# Patient Record
Sex: MALE | Race: WHITE | NOT HISPANIC OR LATINO | Employment: UNEMPLOYED | URBAN - METROPOLITAN AREA
[De-identification: names, ages, dates, MRNs, and addresses within clinical notes are randomized per-mention and may not be internally consistent; named-entity substitution may affect disease eponyms.]

---

## 2021-03-01 ENCOUNTER — OFFICE VISIT (OUTPATIENT)
Dept: GASTROENTEROLOGY | Facility: CLINIC | Age: 41
End: 2021-03-01

## 2021-03-01 ENCOUNTER — TELEPHONE (OUTPATIENT)
Dept: GASTROENTEROLOGY | Facility: CLINIC | Age: 41
End: 2021-03-01

## 2021-03-01 VITALS — WEIGHT: 227.4 LBS | HEIGHT: 72 IN | TEMPERATURE: 97 F | BODY MASS INDEX: 30.8 KG/M2

## 2021-03-01 DIAGNOSIS — R10.13 DYSPEPSIA: Primary | ICD-10-CM

## 2021-03-01 DIAGNOSIS — R13.19 ESOPHAGEAL DYSPHAGIA: ICD-10-CM

## 2021-03-01 DIAGNOSIS — Z80.0 FAMILY HISTORY OF COLON CANCER: ICD-10-CM

## 2021-03-01 DIAGNOSIS — Z83.71 FAMILY HISTORY OF POLYPS IN THE COLON: ICD-10-CM

## 2021-03-01 DIAGNOSIS — R10.31 RIGHT LOWER QUADRANT ABDOMINAL PAIN: ICD-10-CM

## 2021-03-01 DIAGNOSIS — R10.13 EPIGASTRIC PAIN: ICD-10-CM

## 2021-03-01 PROBLEM — K12.0 APHTHOUS ULCER OF MOUTH: Status: ACTIVE | Noted: 2019-07-18

## 2021-03-01 PROBLEM — D22.9 MULTIPLE NEVI: Status: ACTIVE | Noted: 2021-03-01

## 2021-03-01 PROBLEM — F41.1 ANXIETY STATE: Status: ACTIVE | Noted: 2017-11-25

## 2021-03-01 PROBLEM — K21.9 GASTROESOPHAGEAL REFLUX DISEASE: Status: ACTIVE | Noted: 2017-11-25

## 2021-03-01 PROBLEM — M54.2 NECK PAIN: Status: ACTIVE | Noted: 2017-08-14

## 2021-03-01 PROBLEM — Z72.0 TOBACCO USER: Status: ACTIVE | Noted: 2019-07-18

## 2021-03-01 PROBLEM — Z83.719 FAMILY HISTORY OF POLYPS IN THE COLON: Status: ACTIVE | Noted: 2021-03-01

## 2021-03-01 PROCEDURE — 99204 OFFICE O/P NEW MOD 45 MIN: CPT | Performed by: NURSE PRACTITIONER

## 2021-03-01 RX ORDER — PANTOPRAZOLE SODIUM 40 MG/1
40 TABLET, DELAYED RELEASE ORAL
Status: ON HOLD | COMMUNITY
Start: 2021-02-17 | End: 2021-04-06 | Stop reason: SDUPTHER

## 2021-03-01 NOTE — PROGRESS NOTES
Chief Complaint   Patient presents with   • Abdominal Pain     HPI    Héctor Crabtree is a  40 y.o. male here to establish care as a new patient for abdominal pain and dyspepsia.  This patient will also follow with Dr. Dubon.  This patient was seen in the emergency room in January of this year ([facility]) for complaints of left-sided chest pain with burning for the last 16 years but worse at that time.  Worse with laying down and worse with certain foods.  Work-up at that time showed normal labs, negative CTA, negative troponins, and no signs of acute ischemia on EKG.    Currently he reports some improvement in epigastric pain after starting Protonix 40 mg p.o. daily 1 week ago.  Pain still mild in nature comes and goes seems to correlate with eating described as a burning sensation.  No nausea, vomiting, or weight loss.  Some very mild descriptions of what sounds like esophageal dysphagia relieved with drinking water.  He has never had an EGD.    He goes on to complain of right lower quadrant abdominal discomfort described as a cramping aching sensation that comes and goes.  He denies diarrhea, constipation, rectal bleeding.  His father recently had a colonoscopy with several polyps removed.  His grandmother on his father side has a history of colon cancer.    He rarely drinks alcohol.  He rarely uses NSAIDs.  He still has his gallbladder.      Past Medical History:   Diagnosis Date   • GERD (gastroesophageal reflux disease)        Past Surgical History:   Procedure Laterality Date   • EXCISION MASS HEAD/NECK  2007       Scheduled Meds:  Outpatient Encounter Medications as of 3/1/2021   Medication Sig Dispense Refill   • pantoprazole (PROTONIX) 40 MG EC tablet Take 40 mg by mouth Every Morning Before Breakfast.       No facility-administered encounter medications on file as of 3/1/2021.        Continuous Infusions:No current facility-administered medications for this visit.       PRN Meds:.    No Known  Allergies    Social History     Socioeconomic History   • Marital status: Single     Spouse name: Not on file   • Number of children: Not on file   • Years of education: Not on file   • Highest education level: Not on file   Tobacco Use   • Smoking status: Current Every Day Smoker   • Smokeless tobacco: Current User   • Tobacco comment: Vapes   Substance and Sexual Activity   • Alcohol use: Not Currently   • Drug use: Yes     Types: Marijuana   • Sexual activity: Defer       Family History   Problem Relation Age of Onset   • Colon polyps Father    • Kidney failure Father    • Crohn's disease Paternal Grandmother        Review of Systems   Constitutional: Negative for activity change, appetite change, fatigue, fever and unexpected weight change.   HENT: Positive for trouble swallowing. Negative for voice change.    Eyes: Negative.    Respiratory: Negative for apnea, cough, choking, chest tightness, shortness of breath and wheezing.    Cardiovascular: Negative for chest pain, palpitations and leg swelling.   Gastrointestinal: Positive for abdominal pain. Negative for abdominal distention, anal bleeding, blood in stool, constipation, diarrhea, nausea, rectal pain and vomiting.   Endocrine: Negative.    Genitourinary: Negative.    Musculoskeletal: Negative.    Skin: Negative.    Allergic/Immunologic: Negative.    Neurological: Negative.    Hematological: Negative.    Psychiatric/Behavioral: Negative.        Vitals:    03/01/21 1406   Temp: 97 °F (36.1 °C)       Physical Exam  Constitutional:       Appearance: He is well-developed.   HENT:      Head: Normocephalic.      Nose: Nose normal.   Eyes:      Pupils: Pupils are equal, round, and reactive to light.   Neck:      Musculoskeletal: Normal range of motion.   Cardiovascular:      Rate and Rhythm: Normal rate and regular rhythm.      Heart sounds: Normal heart sounds.   Pulmonary:      Effort: Pulmonary effort is normal. No respiratory distress.      Breath sounds:  Normal breath sounds. No wheezing.   Abdominal:      General: Bowel sounds are normal. There is no distension.      Palpations: Abdomen is soft. There is no mass.      Tenderness: There is abdominal tenderness. There is no guarding.      Hernia: No hernia is present.   Musculoskeletal: Normal range of motion.   Skin:     General: Skin is warm and dry.      Capillary Refill: Capillary refill takes less than 2 seconds.   Neurological:      Mental Status: He is alert and oriented to person, place, and time.   Psychiatric:         Behavior: Behavior normal.       No radiology results for the last 7 days    Diagnoses and all orders for this visit:    1. Epigastric pain (Primary)  -     Case Request; Standing  -     Obtain Informed Consent; Standing  -     Verify Bowel Prep Was Successful; Standing  -     Give Tap Water Enema If Bowel Prep Insufficient; Standing  -     Case Request    2. Esophageal dysphagia  -     Case Request; Standing  -     Obtain Informed Consent; Standing  -     Verify Bowel Prep Was Successful; Standing  -     Give Tap Water Enema If Bowel Prep Insufficient; Standing  -     Case Request    3. Dyspepsia  -     Case Request; Standing  -     Obtain Informed Consent; Standing  -     Verify Bowel Prep Was Successful; Standing  -     Give Tap Water Enema If Bowel Prep Insufficient; Standing  -     Case Request    4. Right lower quadrant abdominal pain  -     Case Request; Standing  -     Obtain Informed Consent; Standing  -     Verify Bowel Prep Was Successful; Standing  -     Give Tap Water Enema If Bowel Prep Insufficient; Standing  -     Case Request    5. Family history of polyps in the colon  -     Case Request; Standing  -     Obtain Informed Consent; Standing  -     Verify Bowel Prep Was Successful; Standing  -     Give Tap Water Enema If Bowel Prep Insufficient; Standing  -     Case Request    6. Family history of colon cancer  -     Case Request; Standing  -     Obtain Informed Consent;  Standing  -     Verify Bowel Prep Was Successful; Standing  -     Give Tap Water Enema If Bowel Prep Insufficient; Standing  -     Case Request    Assessment/plan    Pleasant 40-year-old male seen today to establish care as a new patient for a number of GI complaints.  Given his constellation of symptoms recommend bidirectional endoscopic evaluation with Dr. Dubon.  Continue current dosage of Protonix as this is certainly helping.  Antireflux dietary precautions reviewed with the patient.  Consider HIDA scan if warranted.  Follow-up and further recommendations pending the aforementioned work-up.

## 2021-03-22 ENCOUNTER — TRANSCRIBE ORDERS (OUTPATIENT)
Dept: SLEEP MEDICINE | Facility: HOSPITAL | Age: 41
End: 2021-03-22

## 2021-03-22 DIAGNOSIS — Z01.818 OTHER SPECIFIED PRE-OPERATIVE EXAMINATION: Primary | ICD-10-CM

## 2021-04-03 ENCOUNTER — LAB (OUTPATIENT)
Dept: LAB | Facility: HOSPITAL | Age: 41
End: 2021-04-03

## 2021-04-03 DIAGNOSIS — Z01.818 OTHER SPECIFIED PRE-OPERATIVE EXAMINATION: ICD-10-CM

## 2021-04-03 PROCEDURE — U0004 COV-19 TEST NON-CDC HGH THRU: HCPCS

## 2021-04-03 PROCEDURE — C9803 HOPD COVID-19 SPEC COLLECT: HCPCS

## 2021-04-05 LAB — SARS-COV-2 RNA RESP QL NAA+PROBE: NOT DETECTED

## 2021-04-06 ENCOUNTER — ANESTHESIA EVENT (OUTPATIENT)
Dept: GASTROENTEROLOGY | Facility: HOSPITAL | Age: 41
End: 2021-04-06

## 2021-04-06 ENCOUNTER — HOSPITAL ENCOUNTER (OUTPATIENT)
Facility: HOSPITAL | Age: 41
Setting detail: HOSPITAL OUTPATIENT SURGERY
Discharge: HOME | End: 2021-04-06
Attending: INTERNAL MEDICINE | Admitting: INTERNAL MEDICINE

## 2021-04-06 ENCOUNTER — ANESTHESIA (OUTPATIENT)
Dept: GASTROENTEROLOGY | Facility: HOSPITAL | Age: 41
End: 2021-04-06

## 2021-04-06 VITALS
OXYGEN SATURATION: 100 % | SYSTOLIC BLOOD PRESSURE: 105 MMHG | RESPIRATION RATE: 16 BRPM | BODY MASS INDEX: 29.42 KG/M2 | DIASTOLIC BLOOD PRESSURE: 63 MMHG | HEIGHT: 73 IN | WEIGHT: 222 LBS | HEART RATE: 67 BPM

## 2021-04-06 DIAGNOSIS — R13.19 ESOPHAGEAL DYSPHAGIA: ICD-10-CM

## 2021-04-06 DIAGNOSIS — Z83.71 FAMILY HISTORY OF POLYPS IN THE COLON: ICD-10-CM

## 2021-04-06 DIAGNOSIS — R10.13 DYSPEPSIA: ICD-10-CM

## 2021-04-06 DIAGNOSIS — R10.31 RIGHT LOWER QUADRANT ABDOMINAL PAIN: ICD-10-CM

## 2021-04-06 DIAGNOSIS — R10.13 EPIGASTRIC PAIN: ICD-10-CM

## 2021-04-06 DIAGNOSIS — Z80.0 FAMILY HISTORY OF COLON CANCER: ICD-10-CM

## 2021-04-06 PROCEDURE — 43239 EGD BIOPSY SINGLE/MULTIPLE: CPT | Performed by: INTERNAL MEDICINE

## 2021-04-06 PROCEDURE — S0260 H&P FOR SURGERY: HCPCS | Performed by: INTERNAL MEDICINE

## 2021-04-06 PROCEDURE — 88305 TISSUE EXAM BY PATHOLOGIST: CPT | Performed by: INTERNAL MEDICINE

## 2021-04-06 PROCEDURE — 25010000002 PROPOFOL 10 MG/ML EMULSION: Performed by: ANESTHESIOLOGY

## 2021-04-06 PROCEDURE — 45385 COLONOSCOPY W/LESION REMOVAL: CPT | Performed by: INTERNAL MEDICINE

## 2021-04-06 PROCEDURE — 43450 DILATE ESOPHAGUS 1/MULT PASS: CPT | Performed by: INTERNAL MEDICINE

## 2021-04-06 RX ORDER — SODIUM CHLORIDE, SODIUM LACTATE, POTASSIUM CHLORIDE, CALCIUM CHLORIDE 600; 310; 30; 20 MG/100ML; MG/100ML; MG/100ML; MG/100ML
30 INJECTION, SOLUTION INTRAVENOUS CONTINUOUS PRN
Status: DISCONTINUED | OUTPATIENT
Start: 2021-04-06 | End: 2021-04-06 | Stop reason: HOSPADM

## 2021-04-06 RX ORDER — PROPOFOL 10 MG/ML
VIAL (ML) INTRAVENOUS AS NEEDED
Status: DISCONTINUED | OUTPATIENT
Start: 2021-04-06 | End: 2021-04-06 | Stop reason: SURG

## 2021-04-06 RX ORDER — SUCRALFATE 1 G/1
1 TABLET ORAL 2 TIMES DAILY
Qty: 60 TABLET | Refills: 3 | Status: SHIPPED | OUTPATIENT
Start: 2021-04-06 | End: 2022-08-16

## 2021-04-06 RX ORDER — PANTOPRAZOLE SODIUM 40 MG/1
40 TABLET, DELAYED RELEASE ORAL 2 TIMES DAILY
Qty: 60 TABLET | Refills: 12 | Status: SHIPPED | OUTPATIENT
Start: 2021-04-06 | End: 2022-08-16

## 2021-04-06 RX ADMIN — SODIUM CHLORIDE, POTASSIUM CHLORIDE, SODIUM LACTATE AND CALCIUM CHLORIDE 30 ML/HR: 600; 310; 30; 20 INJECTION, SOLUTION INTRAVENOUS at 15:15

## 2021-04-06 RX ADMIN — PROPOFOL 550 MG: 10 INJECTION, EMULSION INTRAVENOUS at 15:55

## 2021-04-06 NOTE — NURSING NOTE
Contacted the patient to see if he could arrive to the facility earlier for his colonoscopy and egd. The patient refused to arrive an earlier.

## 2021-04-06 NOTE — ANESTHESIA PREPROCEDURE EVALUATION
Anesthesia Evaluation     Patient summary reviewed and Nursing notes reviewed   NPO Solid Status: > 8 hours             Airway   Mallampati: II  TM distance: >3 FB  Neck ROM: full  no difficulty expected  Dental - normal exam     Pulmonary - normal exam   Cardiovascular - normal exam        Neuro/Psych  (+) numbness, psychiatric history Anxiety,     GI/Hepatic/Renal/Endo    (+)  GERD,      Musculoskeletal     (+) neck pain,   Abdominal  - normal exam   Substance History      OB/GYN          Other                        Anesthesia Plan    ASA 2     MAC       Anesthetic plan, all risks, benefits, and alternatives have been provided, discussed and informed consent has been obtained with: patient.

## 2021-04-06 NOTE — ANESTHESIA POSTPROCEDURE EVALUATION
"Patient: Héctor Crabtree    Procedure Summary     Date: 04/06/21 Room / Location: [facility] ENDOSCOPY 8 /  [facility]    Anesthesia Start: 1526 Anesthesia Stop: 1602    Procedures:       ESOPHAGOGASTRODUODENOSCOPY WITH COLD BX'S AND ESOPHAGEAL DILATION WITH 54 MM BENITES (N/A Esophagus)      COLONOSCOPY TO CECUM/TI WITH HOT SNARE POLYPECTOMIES AND COLD SNARE POLYPECTOMY (N/A ) Diagnosis:       Epigastric pain      Esophageal dysphagia      Dyspepsia      Right lower quadrant abdominal pain      Family history of polyps in the colon      Family history of colon cancer      (Epigastric pain [R10.13])      (Esophageal dysphagia [R13.10])      (Dyspepsia [R10.13])      (Right lower quadrant abdominal pain [R10.31])      (Family history of polyps in the colon [Z83.71])      (Family history of colon cancer [Z80.0])    Surgeons: Master Dubon MD Provider: Spike Sr MD    Anesthesia Type: MAC ASA Status: 2          Anesthesia Type: MAC    Vitals  Vitals Value Taken Time   /78 04/06/21 1559   Temp     Pulse 67 04/06/21 1559   Resp 14 04/06/21 1559   SpO2 69 % 04/06/21 1559           Post Anesthesia Care and Evaluation    Patient location during evaluation: PACU  Patient participation: complete - patient participated  Level of consciousness: awake and alert  Pain score: 1  Pain management: adequate  Airway patency: patent  Anesthetic complications: No anesthetic complications  PONV Status: none  Cardiovascular status: acceptable  Respiratory status: acceptable  Hydration status: acceptable    Comments: /78 (BP Location: Left arm, Patient Position: Lying)   Pulse 67   Resp 14   Ht 185.4 cm (73\")   Wt 101 kg (222 lb)   SpO2 (!) 69%   BMI 29.29 kg/m²       "

## 2021-04-06 NOTE — H&P
"[facility], [place], operated by [facility]  Pre Procedure History & Physical    Chief Complaint:   GERD  Abdominal pain  Family history of colon polyps and cancer    Subjective     HPI:   See above    Past Medical History:   Past Medical History:   Diagnosis Date   • Abdominal pain    • GERD (gastroesophageal reflux disease)        Past Surgical History:  Past Surgical History:   Procedure Laterality Date   • EXCISION MASS HEAD/NECK  2007   • WISDOM TOOTH EXTRACTION         Family History:  Family History   Problem Relation Age of Onset   • Colon polyps Father    • Kidney failure Father    • Crohn's disease Paternal Grandmother    • Malig Hyperthermia Neg Hx        Social History:   reports that he has been smoking. He uses smokeless tobacco. He reports previous alcohol use. He reports current drug use. Drug: Marijuana.    Medications:   Medications Prior to Admission   Medication Sig Dispense Refill Last Dose   • pantoprazole (PROTONIX) 40 MG EC tablet Take 40 mg by mouth Every Morning Before Breakfast.          Allergies:  Patient has no known allergies.    ROS:    10 point review of systems is otherwise negative, pertinent positives are found in the history of present illness or subjective portion of this dictation     Objective     Height 185.4 cm (73\"), weight 101 kg (222 lb).    Physical Exam   Constitutional: Pt is oriented to person, place, and time and well-developed, well-nourished, and in no distress.   Mouth/Throat: Oropharynx is clear and moist.   Neck: Normal range of motion.   Cardiovascular: Normal rate, regular rhythm and normal heart sounds.    Pulmonary/Chest: Effort normal and breath sounds normal.   Abdominal: Soft. Nontender  Skin: Skin is warm and dry.   Psychiatric: Mood, memory, affect and judgment normal.     Assessment/Plan     Diagnosis:  See above    Anticipated Surgical Procedure:  Directional endoscopy    The risks, benefits, and alternatives of this procedure have been discussed with the " patient or the responsible party- the patient understands and agrees to proceed.

## 2021-04-08 LAB
CYTO UR: NORMAL
LAB AP CASE REPORT: NORMAL
PATH REPORT.FINAL DX SPEC: NORMAL
PATH REPORT.GROSS SPEC: NORMAL

## 2021-04-11 NOTE — PROGRESS NOTES
Tubular adenoma colon polypsColonoscopy recall 2 yearsSchedule right upper quadrant ultrasound and HIDA scan with CCKOffice visit Negar next available

## 2021-04-20 ENCOUNTER — TELEPHONE (OUTPATIENT)
Dept: GASTROENTEROLOGY | Facility: CLINIC | Age: 41
End: 2021-04-20

## 2021-04-29 NOTE — TELEPHONE ENCOUNTER
Patient called advised as per Dr. Dubon's note. He verb understanding. F/u appt with Negar, 06/11@2148.   Repeat c/s in 2 years added to recall and HM lists

## 2021-05-06 ENCOUNTER — HOSPITAL ENCOUNTER (OUTPATIENT)
Dept: ULTRASOUND IMAGING | Facility: HOSPITAL | Age: 41
Discharge: HOME | End: 2021-05-06
Admitting: INTERNAL MEDICINE

## 2021-05-06 ENCOUNTER — HOSPITAL ENCOUNTER (OUTPATIENT)
Dept: NUCLEAR MEDICINE | Facility: HOSPITAL | Age: 41
Discharge: HOME | End: 2021-05-06

## 2021-05-06 PROCEDURE — A9537 TC99M MEBROFENIN: HCPCS | Performed by: INTERNAL MEDICINE

## 2021-05-06 PROCEDURE — 0 TECHNETIUM TC 99M MEBROFENIN KIT: Performed by: INTERNAL MEDICINE

## 2021-05-06 PROCEDURE — 78226 HEPATOBILIARY SYSTEM IMAGING: CPT

## 2021-05-06 PROCEDURE — 76700 US EXAM ABDOM COMPLETE: CPT

## 2021-05-06 RX ORDER — KIT FOR THE PREPARATION OF TECHNETIUM TC 99M MEBROFENIN 45 MG/10ML
1 INJECTION, POWDER, LYOPHILIZED, FOR SOLUTION INTRAVENOUS
Status: COMPLETED | OUTPATIENT
Start: 2021-05-06 | End: 2021-05-06

## 2021-05-06 RX ADMIN — MEBROFENIN 1 DOSE: 45 INJECTION, POWDER, LYOPHILIZED, FOR SOLUTION INTRAVENOUS at 09:40

## 2021-05-07 ENCOUNTER — IMMUNIZATION (OUTPATIENT)
Dept: VACCINE CLINIC | Facility: HOSPITAL | Age: 41
End: 2021-05-07

## 2021-05-07 PROCEDURE — 91300 HC SARSCOV02 VAC 30MCG/0.3ML IM: CPT | Performed by: INTERNAL MEDICINE

## 2021-05-07 PROCEDURE — 0001A: CPT | Performed by: INTERNAL MEDICINE

## 2021-05-28 ENCOUNTER — IMMUNIZATION (OUTPATIENT)
Dept: VACCINE CLINIC | Facility: HOSPITAL | Age: 41
End: 2021-05-28

## 2021-05-28 PROCEDURE — 91300 HC SARSCOV02 VAC 30MCG/0.3ML IM: CPT | Performed by: INTERNAL MEDICINE

## 2021-05-28 PROCEDURE — 0002A: CPT | Performed by: INTERNAL MEDICINE

## 2022-08-16 ENCOUNTER — OFFICE VISIT (OUTPATIENT)
Dept: GASTROENTEROLOGY | Facility: CLINIC | Age: 42
End: 2022-08-16

## 2022-08-16 VITALS — BODY MASS INDEX: 29.82 KG/M2 | TEMPERATURE: 97.5 F | HEIGHT: 73 IN | WEIGHT: 225 LBS

## 2022-08-16 DIAGNOSIS — R10.31 RIGHT LOWER QUADRANT ABDOMINAL PAIN: Primary | ICD-10-CM

## 2022-08-16 PROCEDURE — 99214 OFFICE O/P EST MOD 30 MIN: CPT | Performed by: NURSE PRACTITIONER

## 2022-08-16 RX ORDER — CALCIUM CARBONATE 200(500)MG
1 TABLET,CHEWABLE ORAL DAILY
COMMUNITY

## 2022-08-16 NOTE — PROGRESS NOTES
Chief Complaint   Patient presents with   • Abdominal Pain       HPI    Héctor Crabtree is a  42 y.o. male here with a history of lap flako for a follow up visit for abdominal pain.    This patient follows with Dr. Dubon and myself.    Patient reports intermittent right lower quadrant abdominal pain onset around age 24 worse over the past several months.  Pain made worse with physical activity can often correlate with defecation.  Denies diarrhea constipation or rectal bleeding.  Gets frequent gas and bloating.  Has tried antispasmodics in the past with some improvement.    Gets rare heartburn with dietary indiscretions otherwise he is without upper GI complaints.    Recent labs with [facility] reviewed --normal hemogram and essentially normal LFTs.    Additional data reviewed:    EGD 2021 w/ Normal findings however empiric dilation of the esophagus performed.  C-scope 2021 w/ Normal ileum, 5 TA polyps, normal internal hemorrhoids.  Recall 2 years.    Past Medical History:   Diagnosis Date   • Abdominal pain    • GERD (gastroesophageal reflux disease)        Past Surgical History:   Procedure Laterality Date   • CHOLECYSTECTOMY     • COLONOSCOPY N/A 04/06/2021    Procedure: COLONOSCOPY TO CECUM/TI WITH HOT SNARE POLYPECTOMIES AND COLD SNARE POLYPECTOMY;  Surgeon: Master Dubon MD;  Location: [facility] ENDOSCOPY;  Service: Gastroenterology;  Laterality: N/A;  pre: FAMILY HX OF COLON POLYPS AND COLON CANCER  post: NORMAL TI, POLYPS, HEMORRHOIDS   • ENDOSCOPY N/A 04/06/2021    Procedure: ESOPHAGOGASTRODUODENOSCOPY WITH COLD BX'S AND ESOPHAGEAL DILATION WITH 54 MM BENITES;  Surgeon: Master Dubon MD;  [facility];  Service: Gastroenterology;  Laterality: N/A;  pre: GERD, ABDOMINAL PAIN  post: NORMAL   • EXCISION MASS HEAD/NECK  2007   • WISDOM TOOTH EXTRACTION         Scheduled Meds:     Continuous Infusions: No current facility-administered medications for this visit.      PRN Meds:      No Known Allergies    Social History     Socioeconomic History   • Marital status: Single   Tobacco Use   • Smoking status: Former Smoker     Quit date:      Years since quittin.6   • Smokeless tobacco: Former User   • Tobacco comment: Vapes   Vaping Use   • Vaping Use: Every day   • Substances: Nicotine   • Devices: Pre-filled or refillable cartridge, Refillable tank   Substance and Sexual Activity   • Alcohol use: Not Currently   • Drug use: Yes     Types: Marijuana   • Sexual activity: Defer       Family History   Problem Relation Age of Onset   • Colon polyps Father    • Kidney failure Father    • Crohn's disease Paternal Grandmother    • Malig Hyperthermia Neg Hx        Review of Systems   Constitutional: Negative for activity change, appetite change, fatigue, fever and unexpected weight change.   HENT: Negative for trouble swallowing.    Respiratory: Negative for apnea, cough, choking, chest tightness, shortness of breath and wheezing.    Cardiovascular: Negative for chest pain, palpitations and leg swelling.   Gastrointestinal: Positive for abdominal pain. Negative for abdominal distention, anal bleeding, blood in stool, constipation, diarrhea, nausea, rectal pain and vomiting.       Vitals:    22 1449   Temp: 97.5 °F (36.4 °C)       Physical Exam  Constitutional:       Appearance: He is well-developed.   Abdominal:      General: Bowel sounds are normal. There is no distension.      Palpations: Abdomen is soft. There is no mass.      Tenderness: There is abdominal tenderness. There is no guarding.      Hernia: No hernia is present.          Comments: Tenderness noted in the area marked above   Skin:     General: Skin is warm and dry.      Capillary Refill: Capillary refill takes less than 2 seconds.   Neurological:      Mental Status: He is alert and oriented to person, place, and time.   Psychiatric:         Behavior: Behavior normal.     Assessment    Diagnoses and all orders for this  visit:    1. Right lower quadrant abdominal pain (Primary)  Overview:  Added automatically from request for surgery [id number]    Orders:  -     CT Abdomen Pelvis With Contrast; Future    Other orders  -     hyoscyamine (LEVSIN) 0.125 MG SL tablet; Take 1 tablet by mouth 3 (Three) Times a Day As Needed for Cramping.  Dispense: 120 tablet; Refill: 5  -     ultra-purified peppermint oil (IBGARD) 90 mg capsule capsule; Take 2 capsules by mouth 3 (Three) Times a Day As Needed (Abdominal discomfort gas and bloating) for up to 10 doses.  Dispense: 10 capsule; Refill: 0     Plan    Recommend CT of the abdomen and pelvis with contrast to further evaluate worsening of chronic right lower quadrant abdominal pain rule out appendicitis versus colitis.  Trial of antispasmodic Levsin in the interim.  Also discussed the benefits of IBgard, samples with dosing instructions provided.  Follow-up and further recommendations pending the aforementioned work-up.         MARCIE Vail  [facility]  [address] [place] [address]  Office: [phone]

## 2022-09-09 ENCOUNTER — HOSPITAL ENCOUNTER (OUTPATIENT)
Dept: CT IMAGING | Facility: HOSPITAL | Age: 42
Discharge: HOME | End: 2022-09-09
Admitting: NURSE PRACTITIONER

## 2022-09-09 DIAGNOSIS — R10.31 RIGHT LOWER QUADRANT ABDOMINAL PAIN: ICD-10-CM

## 2022-09-09 PROCEDURE — 25010000002 IOPAMIDOL 61 % SOLUTION: Performed by: NURSE PRACTITIONER

## 2022-09-09 PROCEDURE — 74177 CT ABD & PELVIS W/CONTRAST: CPT

## 2022-09-09 PROCEDURE — 0 DIATRIZOATE MEGLUMINE & SODIUM PER 1 ML: Performed by: NURSE PRACTITIONER

## 2022-09-09 RX ADMIN — IOPAMIDOL 100 ML: 612 INJECTION, SOLUTION INTRAVENOUS at 11:38

## 2022-09-09 RX ADMIN — DIATRIZOATE MEGLUMINE AND DIATRIZOATE SODIUM 30 ML: 660; 100 LIQUID ORAL; RECTAL at 10:35

## 2022-09-27 ENCOUNTER — TELEPHONE (OUTPATIENT)
Dept: GASTROENTEROLOGY | Facility: CLINIC | Age: 42
End: 2022-09-27

## 2022-09-27 NOTE — TELEPHONE ENCOUNTER
----- Message from MARCIE Vail sent at 9/13/2022 12:38 PM EDT -----  Please inform the patient nothing worrisome on CT scan is antispasmodic Levsin helping?

## 2022-10-25 ENCOUNTER — OFFICE VISIT (OUTPATIENT)
Dept: GASTROENTEROLOGY | Facility: CLINIC | Age: 42
End: 2022-10-25

## 2022-10-25 VITALS — HEIGHT: 73 IN | BODY MASS INDEX: 29.95 KG/M2 | TEMPERATURE: 96.8 F | WEIGHT: 226 LBS

## 2022-10-25 DIAGNOSIS — Z86.010 PERSONAL HISTORY OF COLONIC POLYPS: ICD-10-CM

## 2022-10-25 DIAGNOSIS — Z83.71 FAMILY HISTORY OF POLYPS IN THE COLON: ICD-10-CM

## 2022-10-25 DIAGNOSIS — Z80.0 FAMILY HISTORY OF COLON CANCER: ICD-10-CM

## 2022-10-25 DIAGNOSIS — R12 HEARTBURN: ICD-10-CM

## 2022-10-25 DIAGNOSIS — K58.2 IRRITABLE BOWEL SYNDROME WITH BOTH CONSTIPATION AND DIARRHEA: Primary | ICD-10-CM

## 2022-10-25 PROCEDURE — 99214 OFFICE O/P EST MOD 30 MIN: CPT | Performed by: NURSE PRACTITIONER

## 2022-10-25 RX ORDER — FLUTICASONE PROPIONATE 50 MCG
2 SPRAY, SUSPENSION (ML) NASAL DAILY
COMMUNITY

## 2022-10-25 RX ORDER — DICYCLOMINE HYDROCHLORIDE 10 MG/1
CAPSULE ORAL
COMMUNITY
Start: 2022-10-20

## 2022-10-25 NOTE — PROGRESS NOTES
Chief Complaint   Patient presents with   • Abdominal Pain       HPI    Héctor Crabtree is a  42 y.o. male here with a history of lap flako to follow-up for abdominal pain.    This patient follows with Dr. Dubon and myself.    Recent CT of the abdomen and pelvis with contrast unremarkable.    On visit today patient still reports fluctuant bowel pattern alternating between small-volume stools or constipation (no bowel movement for 2 days) followed by episodes of diarrhea with associated generalized abdominal discomfort, gas, bloating.  IBgard exacerbated his heartburn.  No relief from Levsin.  Has not tried Bentyl recently prescribed by his primary care provider.  No rectal bleeding.    Additional data reviewed:     EGD 2021 w/ Normal findings however empiric dilation of the esophagus performed.  C-scope 2021 w/ Normal ileum, 5 TA polyps, normal internal hemorrhoids.  Recall 2 years.    Past Medical History:   Diagnosis Date   • Abdominal pain    • GERD (gastroesophageal reflux disease)        Past Surgical History:   Procedure Laterality Date   • CHOLECYSTECTOMY     • COLONOSCOPY N/A 04/06/2021    Procedure: COLONOSCOPY TO CECUM/TI WITH HOT SNARE POLYPECTOMIES AND COLD SNARE POLYPECTOMY;  Surgeon: Master Dubon MD;  Location: [facility] ENDOSCOPY;  Service: Gastroenterology;  Laterality: N/A;  pre: FAMILY HX OF COLON POLYPS AND COLON CANCER  post: NORMAL TI, POLYPS, HEMORRHOIDS   • ENDOSCOPY N/A 04/06/2021    Procedure: ESOPHAGOGASTRODUODENOSCOPY WITH COLD BX'S AND ESOPHAGEAL DILATION WITH 54 MM BENITES;  Surgeon: Master Dubon MD;  [facility];  Service: Gastroenterology;  Laterality: N/A;  pre: GERD, ABDOMINAL PAIN  post: NORMAL   • EXCISION MASS HEAD/NECK  2007   • WISDOM TOOTH EXTRACTION         Scheduled Meds:     Continuous Infusions: No current facility-administered medications for this visit.      PRN Meds:     No Known Allergies    Social History     Socioeconomic History   • Marital  status: Single   Tobacco Use   • Smoking status: Every Day     Types: Electronic Cigarette   • Smokeless tobacco: Former   • Tobacco comments:     Vapes   Vaping Use   • Vaping Use: Every day   • Substances: Nicotine   • Devices: Pre-filled or refillable cartridge, Refillable tank   Substance and Sexual Activity   • Alcohol use: Not Currently   • Drug use: Yes     Types: Marijuana   • Sexual activity: Defer       Family History   Problem Relation Age of Onset   • Colon polyps Father    • Kidney failure Father    • Crohn's disease Paternal Grandmother    • Malig Hyperthermia Neg Hx        Review of Systems   Constitutional: Negative for activity change, appetite change, fatigue, fever and unexpected weight change.   HENT: Negative for trouble swallowing.    Respiratory: Negative for apnea, cough, choking, chest tightness, shortness of breath and wheezing.    Cardiovascular: Negative for chest pain, palpitations and leg swelling.   Gastrointestinal: Positive for constipation and diarrhea. Negative for abdominal distention, abdominal pain, anal bleeding, blood in stool, nausea, rectal pain and vomiting.        + Heartburn       Vitals:    10/25/22 1300   Temp: 96.8 °F (36 °C)       Physical Exam  Constitutional:       Appearance: He is well-developed.   Abdominal:      General: Bowel sounds are normal. There is no distension.      Palpations: Abdomen is soft. There is no mass.      Tenderness: There is no abdominal tenderness. There is no guarding.      Hernia: No hernia is present.   Skin:     General: Skin is warm and dry.      Capillary Refill: Capillary refill takes less than 2 seconds.   Neurological:      Mental Status: He is alert and oriented to person, place, and time.   Psychiatric:         Behavior: Behavior normal.       Assessment    Diagnoses and all orders for this visit:    1. Irritable bowel syndrome with both constipation and diarrhea (Primary)    2. Heartburn    3. Personal history of colonic  polyps    4. Family history of polyps in the colon    5. Family history of colon cancer    Other orders  -     riFAXIMin (Xifaxan) 550 MG tablet; Take 1 tablet by mouth 3 (Three) Times a Day for 14 days.  Dispense: 42 tablet; Refill: 0       Plan    Pleasant 42-year-old male with a longstanding history of fluctuant bowel pattern with gas bloating and abdominal cramps with recent thorough work-up which included EGD, colonoscopy, and CT as outlined above.  Work-up consistent with irritable bowel syndrome.  As such recommend course of Xifaxan 550 mg p.o. 3 times daily x14 days and follow clinically.  Recommend patient go ahead and try Bentyl.  Discussed benefits of FD guard for heartburn as patient would prefer to stay off of PPI or H2 blocker.  Follow-up and further recommendations pending patient's response to Xifaxan.  Consider nightly Pamelor if warranted.         MARCIE Vail  [facility]  [address] [place] [address]  Office: [phone]

## 2023-01-14 ENCOUNTER — APPOINTMENT (OUTPATIENT)
Dept: GENERAL RADIOLOGY | Facility: HOSPITAL | Age: 43
End: 2023-01-14
Payer: COMMERCIAL

## 2023-01-14 ENCOUNTER — HOSPITAL ENCOUNTER (EMERGENCY)
Facility: HOSPITAL | Age: 43
Discharge: HOME | End: 2023-01-14
Attending: EMERGENCY MEDICINE | Admitting: EMERGENCY MEDICINE
Payer: COMMERCIAL

## 2023-01-14 VITALS
HEIGHT: 73 IN | TEMPERATURE: 98.3 F | HEART RATE: 74 BPM | WEIGHT: 225 LBS | BODY MASS INDEX: 29.82 KG/M2 | SYSTOLIC BLOOD PRESSURE: 125 MMHG | OXYGEN SATURATION: 98 % | RESPIRATION RATE: 19 BRPM | DIASTOLIC BLOOD PRESSURE: 91 MMHG

## 2023-01-14 DIAGNOSIS — R10.12 LEFT UPPER QUADRANT PAIN: ICD-10-CM

## 2023-01-14 DIAGNOSIS — R13.19 ESOPHAGEAL DYSPHAGIA: Primary | ICD-10-CM

## 2023-01-14 LAB
ALBUMIN SERPL-MCNC: 4.4 G/DL (ref 3.5–5.2)
ALBUMIN/GLOB SERPL: 2.3 G/DL
ALP SERPL-CCNC: 108 U/L (ref 39–117)
ALT SERPL W P-5'-P-CCNC: 9 U/L (ref 1–41)
ANION GAP SERPL CALCULATED.3IONS-SCNC: 10 MMOL/L (ref 5–15)
AST SERPL-CCNC: 12 U/L (ref 1–40)
BASOPHILS # BLD AUTO: 0.06 10*3/MM3 (ref 0–0.2)
BASOPHILS NFR BLD AUTO: 0.9 % (ref 0–1.5)
BILIRUB SERPL-MCNC: 1 MG/DL (ref 0–1.2)
BUN SERPL-MCNC: 12 MG/DL (ref 6–20)
BUN/CREAT SERPL: 9.5 (ref 7–25)
CALCIUM SPEC-SCNC: 9.7 MG/DL (ref 8.6–10.5)
CHLORIDE SERPL-SCNC: 104 MMOL/L (ref 98–107)
CO2 SERPL-SCNC: 26 MMOL/L (ref 22–29)
CREAT SERPL-MCNC: 1.26 MG/DL (ref 0.76–1.27)
D DIMER PPP FEU-MCNC: <0.27 MCGFEU/ML (ref 0–0.5)
DEPRECATED RDW RBC AUTO: 37.2 FL (ref 37–54)
EGFRCR SERPLBLD CKD-EPI 2021: 73 ML/MIN/1.73
EOSINOPHIL # BLD AUTO: 0.17 10*3/MM3 (ref 0–0.4)
EOSINOPHIL NFR BLD AUTO: 2.4 % (ref 0.3–6.2)
ERYTHROCYTE [DISTWIDTH] IN BLOOD BY AUTOMATED COUNT: 12.1 % (ref 12.3–15.4)
GLOBULIN UR ELPH-MCNC: 1.9 GM/DL
GLUCOSE SERPL-MCNC: 100 MG/DL (ref 65–99)
HCT VFR BLD AUTO: 39.2 % (ref 37.5–51)
HGB BLD-MCNC: 13.4 G/DL (ref 13–17.7)
IMM GRANULOCYTES # BLD AUTO: 0.02 10*3/MM3 (ref 0–0.05)
IMM GRANULOCYTES NFR BLD AUTO: 0.3 % (ref 0–0.5)
INR PPP: 0.98 (ref 0.9–1.1)
LYMPHOCYTES # BLD AUTO: 2.13 10*3/MM3 (ref 0.7–3.1)
LYMPHOCYTES NFR BLD AUTO: 30.3 % (ref 19.6–45.3)
MCH RBC QN AUTO: 28.9 PG (ref 26.6–33)
MCHC RBC AUTO-ENTMCNC: 34.2 G/DL (ref 31.5–35.7)
MCV RBC AUTO: 84.5 FL (ref 79–97)
MONOCYTES # BLD AUTO: 0.48 10*3/MM3 (ref 0.1–0.9)
MONOCYTES NFR BLD AUTO: 6.8 % (ref 5–12)
NEUTROPHILS NFR BLD AUTO: 4.18 10*3/MM3 (ref 1.7–7)
NEUTROPHILS NFR BLD AUTO: 59.3 % (ref 42.7–76)
NRBC BLD AUTO-RTO: 0 /100 WBC (ref 0–0.2)
PLATELET # BLD AUTO: 227 10*3/MM3 (ref 140–450)
PMV BLD AUTO: 10 FL (ref 6–12)
POTASSIUM SERPL-SCNC: 3.7 MMOL/L (ref 3.5–5.2)
PROT SERPL-MCNC: 6.3 G/DL (ref 6–8.5)
PROTHROMBIN TIME: 13.1 SECONDS (ref 11.7–14.2)
RBC # BLD AUTO: 4.64 10*6/MM3 (ref 4.14–5.8)
SODIUM SERPL-SCNC: 140 MMOL/L (ref 136–145)
TROPONIN T SERPL-MCNC: <0.01 NG/ML (ref 0–0.03)
WBC NRBC COR # BLD: 7.04 10*3/MM3 (ref 3.4–10.8)

## 2023-01-14 PROCEDURE — 93010 ELECTROCARDIOGRAM REPORT: CPT | Performed by: INTERNAL MEDICINE

## 2023-01-14 PROCEDURE — 85610 PROTHROMBIN TIME: CPT | Performed by: EMERGENCY MEDICINE

## 2023-01-14 PROCEDURE — 99284 EMERGENCY DEPT VISIT MOD MDM: CPT

## 2023-01-14 PROCEDURE — 85379 FIBRIN DEGRADATION QUANT: CPT | Performed by: EMERGENCY MEDICINE

## 2023-01-14 PROCEDURE — 36415 COLL VENOUS BLD VENIPUNCTURE: CPT

## 2023-01-14 PROCEDURE — 93005 ELECTROCARDIOGRAM TRACING: CPT | Performed by: EMERGENCY MEDICINE

## 2023-01-14 PROCEDURE — 80053 COMPREHEN METABOLIC PANEL: CPT | Performed by: EMERGENCY MEDICINE

## 2023-01-14 PROCEDURE — 85025 COMPLETE CBC W/AUTO DIFF WBC: CPT | Performed by: EMERGENCY MEDICINE

## 2023-01-14 PROCEDURE — 93005 ELECTROCARDIOGRAM TRACING: CPT

## 2023-01-14 PROCEDURE — 84484 ASSAY OF TROPONIN QUANT: CPT | Performed by: EMERGENCY MEDICINE

## 2023-01-14 PROCEDURE — 71045 X-RAY EXAM CHEST 1 VIEW: CPT

## 2023-01-14 RX ORDER — LIDOCAINE HYDROCHLORIDE 20 MG/ML
5 SOLUTION OROPHARYNGEAL 2 TIMES DAILY PRN
Qty: 100 ML | Refills: 0 | Status: SHIPPED | OUTPATIENT
Start: 2023-01-14

## 2023-01-14 RX ORDER — LIDOCAINE HYDROCHLORIDE 20 MG/ML
15 SOLUTION OROPHARYNGEAL ONCE
Status: COMPLETED | OUTPATIENT
Start: 2023-01-14 | End: 2023-01-14

## 2023-01-14 RX ORDER — ALUMINA, MAGNESIA, AND SIMETHICONE 2400; 2400; 240 MG/30ML; MG/30ML; MG/30ML
15 SUSPENSION ORAL ONCE
Status: COMPLETED | OUTPATIENT
Start: 2023-01-14 | End: 2023-01-14

## 2023-01-14 RX ADMIN — LIDOCAINE HYDROCHLORIDE 15 ML: 20 SOLUTION ORAL at 18:04

## 2023-01-14 RX ADMIN — ALUMINUM HYDROXIDE, MAGNESIUM HYDROXIDE, AND DIMETHICONE 15 ML: 400; 400; 40 SUSPENSION ORAL at 18:04

## 2023-01-14 NOTE — ED PROVIDER NOTES
EMERGENCY DEPARTMENT ENCOUNTER    Room Number:  18/18  Date seen:  1/14/2023  PCP: Provider, No Known  Historian: Patient      HPI:  Chief Complaint: Chest pain  A complete HPI/ROS/PMH/PSH/SH/FH are unobtainable due to:   Context: Héctor Crabtree is a 42 y.o. male who presents to the ED c/o chest pain.  Patient states he has had some chest pain ongoing for about a year and a half.  Symptoms were felt to be GI related and he has been seen multiple times by GI providers.  He was tried on proton pump inhibitors but they did not help and sweet take them.  He states that he gets sharp chest pain that is intermittent and comes and goes.  Symptoms can last between 10 minutes to an hour.  They generally come on without a certain cause.  Symptoms are often worsened when laying down.  Exertion does not worsen the symptoms and he states that he was able to do 20 minutes of vigorous exercise earlier today without any pain.  Pain is sharp and severe at its worst.  He has had this pain off and on for over a year and a half.  He states that he was hospitalized once at [facility] and had a cardiac echocardiogram and they told him that it was not his heart.  He does not think he has had a stress test.  Heart disease does run in the family his father had a heart attack at age 47.  Patient does vape on a regular basis and also uses frequent marijuana.  Denies hypertension, hyperlipidemia or diabetes.  Denies significant pulmonary embolism risk factors.      MEDICAL RECORD REVIEW (non ED)  I reviewed prior medical records including most recent office visit with Dr. Andi Gupta.  Patient has a history of gastroesophageal reflux disease, chronic abdominal pain and tobacco abuse.  No documented history of coronary artery disease.  Patient was hospitalized in January 2021 with chest pain.  EKG written interpretation did mention J-point elevation.  Patient apparently had had a negative stress test over 10 years ago and deferred a second  1.  Echocardiogram was negative for pericardial effusion.  It was felt that patient had likely GI related pain.  Patient did have EGD by Dr. Dubon in April 2021 and did have esophageal dilation.  He was felt to have some degree of esophageal dysphagia.    PAST MEDICAL HISTORY  Active Ambulatory Problems     Diagnosis Date Noted   • Cervical lymphadenopathy 12/02/2015   • Gastroesophageal reflux disease 11/25/2017   • High risk sexual behavior 08/09/2016   • Inhales drugs 08/09/2016   • Multiple nevi 03/01/2021   • Neck pain 08/14/2017   • Right sided sciatica 01/01/2008   • Tobacco user 07/18/2019   • Aphthous ulcer of mouth 07/18/2019   • Anxiety state 11/25/2017   • Epigastric pain 03/01/2021   • Esophageal dysphagia 03/01/2021   • Dyspepsia 03/01/2021   • Right lower quadrant abdominal pain 03/01/2021   • Family history of polyps in the colon 03/01/2021   • Family history of colon cancer 03/01/2021     Resolved Ambulatory Problems     Diagnosis Date Noted   • No Resolved Ambulatory Problems     Past Medical History:   Diagnosis Date   • Abdominal pain    • GERD (gastroesophageal reflux disease)          PAST SURGICAL HISTORY  Past Surgical History:   Procedure Laterality Date   • CHOLECYSTECTOMY     • COLONOSCOPY N/A 04/06/2021    Procedure: COLONOSCOPY TO CECUM/TI WITH HOT SNARE POLYPECTOMIES AND COLD SNARE POLYPECTOMY;  Surgeon: Master Dubon MD;  Location: [facility] ENDOSCOPY;  Service: Gastroenterology;  Laterality: N/A;  pre: FAMILY HX OF COLON POLYPS AND COLON CANCER  post: NORMAL TI, POLYPS, HEMORRHOIDS   • ENDOSCOPY N/A 04/06/2021    Procedure: ESOPHAGOGASTRODUODENOSCOPY WITH COLD BX'S AND ESOPHAGEAL DILATION WITH 54 MM BENITES;  Surgeon: Master Dubon MD;  Location: [facility] ENDOSCOPY;  Service: Gastroenterology;  Laterality: N/A;  pre: GERD, ABDOMINAL PAIN  post: NORMAL   • EXCISION MASS HEAD/NECK  2007   • WISDOM TOOTH EXTRACTION           FAMILY HISTORY  Family History   Problem Relation Age of  Onset   • Colon polyps Father    • Kidney failure Father    • Crohn's disease Paternal Grandmother    • Malig Hyperthermia Neg Hx          SOCIAL HISTORY  Social History     Socioeconomic History   • Marital status: Single   Tobacco Use   • Smoking status: Every Day     Types: Electronic Cigarette   • Smokeless tobacco: Former   • Tobacco comments:     Vapes   Vaping Use   • Vaping Use: Every day   • Substances: Nicotine   • Devices: Pre-filled or refillable cartridge, Refillable tank   Substance and Sexual Activity   • Alcohol use: Not Currently   • Drug use: Yes     Types: Marijuana   • Sexual activity: Defer         ALLERGIES  Patient has no known allergies.        REVIEW OF SYSTEMS  Review of Systems   Constitutional: Positive for diaphoresis. Negative for fever.   HENT: Negative.  Negative for sore throat.    Eyes: Negative.    Respiratory: Negative.  Negative for cough.    Cardiovascular: Positive for chest pain (As per HPI).   Gastrointestinal: Positive for diarrhea (For several weeks) and nausea.   Genitourinary: Negative.  Negative for dysuria.   Musculoskeletal: Negative.  Negative for back pain.   Skin: Negative.  Negative for rash.   Neurological: Negative.  Negative for headaches.   All other systems reviewed and are negative.           PHYSICAL EXAM  ED Triage Vitals [01/14/23 1552]   Temp Heart Rate Resp BP SpO2   98.3 °F (36.8 °C) 113 19 -- 99 %      Temp src Heart Rate Source Patient Position BP Location FiO2 (%)   -- -- -- -- --       Physical Exam    GENERAL: Alert and pleasant male in no obvious distress.  Triage vitals reviewed and are unremarkable.  Initial blood pressure 129/90.  Pulse 84.  Afebrile.  O2 sats 98% room air.  HENT: nares patent  EYES: no scleral icterus  CV: regular rhythm, regular rate-no murmur  RESPIRATORY: normal effort, clear to auscultation bilaterally-O2 sats upper 90s on room air  ABDOMEN: soft, mild to moderate tenderness palpation left upper quadrant without rebound or  guarding.  MUSCULOSKELETAL: no deformity-no significant swelling or tenderness to palpation  NEURO: Strength sensation and coordination are grossly intact.  Speech and mentation are unremarkable  SKIN: warm, dry      Vital signs and nursing notes reviewed.          LAB RESULTS  Recent Results (from the past 24 hour(s))   ECG 12 Lead Chest Pain    Collection Time: 01/14/23  4:00 PM   Result Value Ref Range    QT Interval 382 ms   Comprehensive Metabolic Panel    Collection Time: 01/14/23  4:36 PM    Specimen: Blood   Result Value Ref Range    Glucose 100 (H) 65 - 99 mg/dL    BUN 12 6 - 20 mg/dL    Creatinine 1.26 0.76 - 1.27 mg/dL    Sodium 140 136 - 145 mmol/L    Potassium 3.7 3.5 - 5.2 mmol/L    Chloride 104 98 - 107 mmol/L    CO2 26.0 22.0 - 29.0 mmol/L    Calcium 9.7 8.6 - 10.5 mg/dL    Total Protein 6.3 6.0 - 8.5 g/dL    Albumin 4.4 3.5 - 5.2 g/dL    ALT (SGPT) 9 1 - 41 U/L    AST (SGOT) 12 1 - 40 U/L    Alkaline Phosphatase 108 39 - 117 U/L    Total Bilirubin 1.0 0.0 - 1.2 mg/dL    Globulin 1.9 gm/dL    A/G Ratio 2.3 g/dL    BUN/Creatinine Ratio 9.5 7.0 - 25.0    Anion Gap 10.0 5.0 - 15.0 mmol/L    eGFR 73.0 >60.0 mL/min/1.73   Protime-INR    Collection Time: 01/14/23  4:36 PM    Specimen: Blood   Result Value Ref Range    Protime 13.1 11.7 - 14.2 Seconds    INR 0.98 0.90 - 1.10   D-dimer, Quantitative    Collection Time: 01/14/23  4:36 PM    Specimen: Blood   Result Value Ref Range    D-Dimer, Quantitative <0.27 0.00 - 0.50 MCGFEU/mL   Troponin    Collection Time: 01/14/23  4:36 PM    Specimen: Blood   Result Value Ref Range    Troponin T <0.010 0.000 - 0.030 ng/mL   CBC Auto Differential    Collection Time: 01/14/23  4:36 PM    Specimen: Blood   Result Value Ref Range    WBC 7.04 3.40 - 10.80 10*3/mm3    RBC 4.64 4.14 - 5.80 10*6/mm3    Hemoglobin 13.4 13.0 - 17.7 g/dL    Hematocrit 39.2 37.5 - 51.0 %    MCV 84.5 79.0 - 97.0 fL    MCH 28.9 26.6 - 33.0 pg    MCHC 34.2 31.5 - 35.7 g/dL    RDW 12.1 (L) 12.3 -  15.4 %    RDW-SD 37.2 37.0 - 54.0 fl    MPV 10.0 6.0 - 12.0 fL    Platelets 227 140 - 450 10*3/mm3    Neutrophil % 59.3 42.7 - 76.0 %    Lymphocyte % 30.3 19.6 - 45.3 %    Monocyte % 6.8 5.0 - 12.0 %    Eosinophil % 2.4 0.3 - 6.2 %    Basophil % 0.9 0.0 - 1.5 %    Immature Grans % 0.3 0.0 - 0.5 %    Neutrophils, Absolute 4.18 1.70 - 7.00 10*3/mm3    Lymphocytes, Absolute 2.13 0.70 - 3.10 10*3/mm3    Monocytes, Absolute 0.48 0.10 - 0.90 10*3/mm3    Eosinophils, Absolute 0.17 0.00 - 0.40 10*3/mm3    Basophils, Absolute 0.06 0.00 - 0.20 10*3/mm3    Immature Grans, Absolute 0.02 0.00 - 0.05 10*3/mm3    nRBC 0.0 0.0 - 0.2 /100 WBC       Ordered the above labs and reviewed the results.        RADIOLOGY  XR Chest 1 View    Result Date: 1/14/2023  PORTABLE CHEST X-RAY  HISTORY: Chest pain.  Portable chest x-ray is provided. Correlation: None.  FINDINGS: The cardiomediastinal silhouette is normal. The lungs are clear. The costophrenic sulci are dry and the bones appear normal. There is no pneumothorax.      Negative.  This report was finalized on 1/14/2023 4:43 PM by Dr. Spike Bowen M.D.        Ordered the above noted radiological studies. Reviewed by me in PACS.            PROCEDURES  Procedures          MEDICATIONS GIVEN IN ER  Medications   aluminum-magnesium hydroxide-simethicone (MAALOX MAX) 400-400-40 MG/5ML suspension 15 mL (15 mL Oral Given 1/14/23 1804)   Lidocaine Viscous HCl (XYLOCAINE) 2 % solution 15 mL (15 mL Mouth/Throat Given 1/14/23 1804)               MEDICAL DECISION MAKING, PROGRESS, and CONSULTS    All labs have been independently reviewed by me.  All radiology studies have been reviewed by me and I have also reviewed the radiology report.   EKG's independently viewed and interpreted by me.  Discussion below represents my analysis of pertinent findings related to patient's condition, differential diagnosis, treatment plan and final disposition.      Additional sources:  - Discussed/ obtained  information from independent historians: Sister who is present at bedside    - External (non-ED) record review: Please see documented above    - Chronic or social conditions impacting care: Marijuana usage    - Shared decision making: I discussed ED evaluation and treatment plan with patient who is in agreement.        Orders placed during this visit:  Orders Placed This Encounter   Procedures   • XR Chest 1 View   • Comprehensive Metabolic Panel   • Protime-INR   • D-dimer, Quantitative   • Troponin   • CBC Auto Differential   • ECG 12 Lead Chest Pain   • CBC & Differential           Differential diagnosis:    Please see as documented below in ED course      Independent interpretation of labs, radiology studies, and discussions with consultants:  ED Course as of 01/14/23 1820   Sat Jan 14, 2023   1607 EKG independently interpreted    Time 4:00 PM  Sinus 81  Normal P waves and AL intervals  Normal axis, diffuse ST elevation, possible early repolarization.    No prior to compare [DB]   [age] [id number]-old male who has had ongoing left upper quadrant pain/chest discomfort for roughly a year and a half.  Symptoms been worsened over the last 1 or 2 days.    On exam he does have some mild tenderness to palpation in the left upper quadrant.    Differential diagnosis would include but is not limited to the following:    Gastritis  Pancreatitis  Diverticulitis  Musculoskeletal pain  Coronary chest pain  Dysrhythmia [DB]   1626 ED HEART SCORE is 2 pending negative troponin [DB]   1626 ED lab testing is reviewed and pretty unremarkable.  The CBC is normal which go against infection or anemia.  Chemistries are benign without evidence of hepatic or renal failure.  Electrolytes are unremarkable.    D-dimer is normal and would greatly decrease my concern for pulmonary embolism or aortic dissection.    Troponin is normal and would go against acute coronary syndrome.  Patient's heart score is 2. [DB]   1744 Chest x-ray  independently reviewed and discussed with Dr. Bowen shows no active disease. [DB]   1815 Patient had significant relief after GI cocktail.  He states that GI cocktails have always helped him in the past with this.    He states that he believes his symptoms might be related to cannabinoid hyperemesis syndrome.  Although he is not having much vomiting he is concerned that some of his pain could be related to chronic marijuana use.  He certainly may have a point here and he is in the process of stopping marijuana.  I agree with this plan and would like him to be off marijuana for about 2 weeks.  If his pain does resolve I think we have the diagnosis nailed.    I will go ahead and prescribe some viscous lidocaine that he could mix with Mylanta to use his own GI cocktail once or twice daily as needed for severe esophageal pain. [DB]      ED Course User Index  [DB] Amari Ahn MD             I used full protective equipment while examining this patient.  This includes face mask, gloves and protective eyewear.  I washed my hands before entering the room and immediately upon leaving the room    DIAGNOSIS  Final diagnoses:   Esophageal dysphagia   Left upper quadrant pain         DISPOSITION  DISCHARGE    Patient discharged in stable condition.    Reviewed implications of results, diagnosis, meds, responsibility to follow up, warning signs and symptoms of possible worsening, potential complications and reasons to return to ER, including increased pain, fever or as needed.    Patient/Family voiced understanding of above instructions.    Discussed plan for discharge, as there is no emergent indication for admission. Patient referred to primary care provider for BP management due to today's BP. Pt/family is agreeable and understands need for follow up and repeat testing.  Pt is aware that discharge does not mean that nothing is wrong but it indicates no emergency is present that requires admission and they must  continue care with follow-up as given below or physician of their choice.     FOLLOW-UP  Negar Joseph, APRN  [address]  [place] [id number] [phone]    In 1 week  Call for Appointment         Medication List      New Prescriptions    Lidocaine Viscous HCl 2 % solution  Commonly known as: XYLOCAINE  Take 5 mL by mouth 2 (Two) Times a Day As Needed for Mild Pain (As needed for esophageal pain).           Where to Get Your Medications      These medications were sent to [facility] #[id number] - [place], [place] - [facility] AT [facility] OF [facility] - [phone]  - [phone] [address] [address]    Phone: [phone]   · Lidocaine Viscous HCl 2 % solution                   Latest Documented Vital Signs:  As of 18:20 EST  BP- 125/91 HR- 74 Temp- 98.3 °F (36.8 °C) O2 sat- 98%              --    Please note that portions of this were completed with a voice recognition program.       Note Disclaimer: At [facility], we believe that sharing information builds trust and better relationships. You are receiving this note because you are receiving care at [facility] or recently visited. It is possible you will see health information before a provider has talked with you about it. This kind of information can be easy to misunderstand. To help you fully understand what it means for your health, we urge you to discuss this note with your provider.           Amari Ahn MD  01/14/23 3791

## 2023-01-14 NOTE — DISCHARGE INSTRUCTIONS
I do recommend continue to avoid marijuana as this could be part of your recurrent abdominal pain.    I have written a prescription for viscous lidocaine which you can use as directed for severe pain.  You may use it twice daily as needed.  You could mix this with Mylanta taken over-the-counter if you would like.

## 2023-01-14 NOTE — ED NOTES
Left sided chest pain that radiates to left arm. Started yesterday. No SOB. Pt reports no cardiac hx. EKG ordered

## 2023-01-15 LAB — QT INTERVAL: 382 MS

## 2024-02-27 NOTE — PROGRESS NOTES
"Subjective   Patient ID: Héctor Crabtree is a 43 y.o. male is being seen for consultation today at the request of MARCIE Kennedy for Radiculopathy, cervical region and Thoracic back pain. Thoracic xray done at [facility] on 2/1/24. Report is in chart and images are in chart. Numbness in neck and left arm. Arm and shoulder weakness.       History of Present Illness    He presents office today for further evaluation of acute left radiating mid back pain that has been present for the past couple of months.  He has been to the emergency room a couple of times for this discomfort due to the fact that he was concerned it could be coming from his heart.  He reports when the mid back pain is present, he also gets pressure and discomfort in the left chest and shoulder.  He has some of the left-sided mid back discomfort right now, but overall it is pretty mild.    He also reports pain that radiates down the left arm with numbness and tingling in the hand and fingers.  Currently, the numbness is in the pinky finger, but earlier it was in the index and middle fingers.  He feels weaker in the left arm and hand.  This discomfort has been present for a few years.  He also reports some numbness and tingling in the left side of his face, and left upper neck.  He had cervical x-rays earlier last year which showed no acute findings.  He underwent thoracic x-rays recently due to the new onset mid back pain.    He also reports a history of low back pain and \"sciatica.\"  He has undergone lumbar epidural and physical therapy for this issue which is currently stable.  He denies any balance or gait issues.    He presents unaccompanied.        The following portions of the patient's history were reviewed and updated as appropriate: allergies, current medications, past family history, past medical history, past social history, past surgical history, and problem list.    Review of Systems   Constitutional:  Negative for chills and " "fever.   HENT:  Negative for trouble swallowing.    Respiratory:  Negative for cough and shortness of breath.    Cardiovascular:  Positive for chest pain. Negative for palpitations and leg swelling.   Gastrointestinal:  Positive for abdominal pain. Negative for constipation.   Genitourinary:  Negative for difficulty urinating and enuresis.   Musculoskeletal:  Positive for back pain, gait problem, neck pain and neck stiffness.   Skin:  Negative for rash.   Neurological:  Positive for weakness and numbness (or tingling).   Hematological:  Does not bruise/bleed easily.   Psychiatric/Behavioral:  Positive for sleep disturbance.        /98   Pulse 97   Ht 185.4 cm (73\")   Wt 111 kg (244 lb 9.6 oz)   SpO2 97%   BMI 32.27 kg/m²       Objective     Vitals:    02/29/24 1004   BP: 130/98   Pulse: 97   SpO2: 97%   Weight: 111 kg (244 lb 9.6 oz)   Height: 185.4 cm (73\")     Body mass index is 32.27 kg/m².    Tobacco Use: High Risk (2/29/2024)    Patient History     Smoking Tobacco Use: Every Day     Smokeless Tobacco Use: Former     Passive Exposure: Not on file          Physical Exam  Vitals reviewed.   Constitutional:       Appearance: Normal appearance.   HENT:      Head: Atraumatic.   Pulmonary:      Effort: Pulmonary effort is normal.   Musculoskeletal:         General: Tenderness (Tender to minimal palpation left lower back at approximately the T10 and 9 levels) present. Normal range of motion.      Comments: Full cervical range of motion  Strength is equal and intact bilateral upper extremities, normal muscle bulk and tone   Skin:     General: Skin is warm and dry.   Neurological:      Mental Status: He is alert and oriented to person, place, and time.      GCS: GCS eye subscore is 4. GCS verbal subscore is 5. GCS motor subscore is 6.      Sensory: Sensory deficit (Decreased sensation left forearm anteriorly and medially) present.      Motor: No weakness or tremor.      Gait: Gait normal.      Deep Tendon " Reflexes:      Reflex Scores:       Tricep reflexes are 2+ on the right side and 2+ on the left side.       Bicep reflexes are 2+ on the right side and 2+ on the left side.       Brachioradialis reflexes are 2+ on the right side and 2+ on the left side.       Patellar reflexes are 2+ on the right side and 2+ on the left side.       Achilles reflexes are 2+ on the right side and 2+ on the left side.     Comments: Gait is stable and upright, nonantalgic  Negative Felton's, negative Spurling's     Psychiatric:         Mood and Affect: Mood normal.       Neurologic Exam     Mental Status   Oriented to person, place, and time.     Gait, Coordination, and Reflexes     Reflexes   Right brachioradialis: 2+  Left brachioradialis: 2+  Right biceps: 2+  Left biceps: 2+  Right triceps: 2+  Left triceps: 2+  Right patellar: 2+  Left patellar: 2+  Right achilles: 2+  Left achilles: 2+          Assessment & Plan   Independent Review of Radiographic Studies:      I personally reviewed the images from the following studies.    Cervical x-rays from [facility] physicians group dated April 23, 2023 reveal straightening of the normal cervical lordosis, but otherwise no acute findings.    Thoracic x-rays from [facility] physicians group dated February 1, 2024 reveal no acute abnormalities.    Medical Decision Making:      He presents with many years of left-sided lower facial numbness, radiating pain with numbness and tingling down the left arm, and new pain in the left mid back.  Exam as noted above, no red flags.  I have ordered cervical and thoracic MRIs for further evaluation of the underlying discomforts.  Regarding the new onset left-sided mid back pain, he is tender in the same distribution as the discomfort.  I think that there could be a musculoskeletal component to his pain, but I want to rule out any underlying nerve component.  Will have her return to the office once the imaging studies are complete.  I think he could also benefit  from physical therapy, but we will discuss this at his next office visit.  Fortunately, he is intact regarding strength, sensation and reflexes.    Plan: Cervical and thoracic MRIs, return to office following    Diagnoses and all orders for this visit:    1. Mid back pain on left side (Primary)  -     MRI Cervical Spine Without Contrast; Future  -     MRI Thoracic Spine Without Contrast; Future    2. Left arm pain  -     MRI Cervical Spine Without Contrast; Future  -     MRI Thoracic Spine Without Contrast; Future    3. Arm paresthesia, left  -     MRI Cervical Spine Without Contrast; Future  -     MRI Thoracic Spine Without Contrast; Future      Return in about 6 weeks (around 4/11/2024).

## 2024-02-29 ENCOUNTER — OFFICE VISIT (OUTPATIENT)
Dept: NEUROSURGERY | Facility: CLINIC | Age: 44
End: 2024-02-29
Payer: COMMERCIAL

## 2024-02-29 VITALS
WEIGHT: 244.6 LBS | HEIGHT: 73 IN | DIASTOLIC BLOOD PRESSURE: 98 MMHG | OXYGEN SATURATION: 97 % | SYSTOLIC BLOOD PRESSURE: 130 MMHG | BODY MASS INDEX: 32.42 KG/M2 | HEART RATE: 97 BPM

## 2024-02-29 DIAGNOSIS — M79.602 LEFT ARM PAIN: ICD-10-CM

## 2024-02-29 DIAGNOSIS — M54.9 MID BACK PAIN ON LEFT SIDE: Primary | ICD-10-CM

## 2024-02-29 DIAGNOSIS — R20.2 ARM PARESTHESIA, LEFT: ICD-10-CM

## 2024-03-01 ENCOUNTER — HOSPITAL ENCOUNTER (OUTPATIENT)
Facility: HOSPITAL | Age: 44
Discharge: LEFT BEFORE DISCHARGE | End: 2024-03-02
Attending: EMERGENCY MEDICINE | Admitting: INTERNAL MEDICINE
Payer: COMMERCIAL

## 2024-03-01 DIAGNOSIS — R07.9 CHEST PAIN, UNSPECIFIED TYPE: Primary | ICD-10-CM

## 2024-03-01 PROCEDURE — 93005 ELECTROCARDIOGRAM TRACING: CPT

## 2024-03-01 PROCEDURE — 99285 EMERGENCY DEPT VISIT HI MDM: CPT

## 2024-03-01 PROCEDURE — 93010 ELECTROCARDIOGRAM REPORT: CPT | Performed by: INTERNAL MEDICINE

## 2024-03-01 RX ORDER — ASPIRIN 325 MG
325 TABLET ORAL ONCE
Status: DISCONTINUED | OUTPATIENT
Start: 2024-03-02 | End: 2024-03-03 | Stop reason: HOSPADM

## 2024-03-01 RX ORDER — SODIUM CHLORIDE 0.9 % (FLUSH) 0.9 %
10 SYRINGE (ML) INJECTION AS NEEDED
Status: DISCONTINUED | OUTPATIENT
Start: 2024-03-01 | End: 2024-03-03 | Stop reason: HOSPADM

## 2024-03-02 ENCOUNTER — APPOINTMENT (OUTPATIENT)
Dept: GENERAL RADIOLOGY | Facility: HOSPITAL | Age: 44
End: 2024-03-02
Payer: COMMERCIAL

## 2024-03-02 ENCOUNTER — APPOINTMENT (OUTPATIENT)
Dept: CARDIOLOGY | Facility: HOSPITAL | Age: 44
End: 2024-03-02
Payer: COMMERCIAL

## 2024-03-02 ENCOUNTER — APPOINTMENT (OUTPATIENT)
Dept: CT IMAGING | Facility: HOSPITAL | Age: 44
End: 2024-03-02
Payer: COMMERCIAL

## 2024-03-02 VITALS
TEMPERATURE: 97.4 F | OXYGEN SATURATION: 98 % | WEIGHT: 240 LBS | RESPIRATION RATE: 16 BRPM | DIASTOLIC BLOOD PRESSURE: 88 MMHG | BODY MASS INDEX: 31.81 KG/M2 | SYSTOLIC BLOOD PRESSURE: 136 MMHG | HEART RATE: 67 BPM | HEIGHT: 73 IN

## 2024-03-02 PROBLEM — R07.9 CHEST PAIN: Status: ACTIVE | Noted: 2024-03-02

## 2024-03-02 LAB
ALBUMIN SERPL-MCNC: 4.3 G/DL (ref 3.5–5.2)
ALBUMIN SERPL-MCNC: 4.5 G/DL (ref 3.5–5.2)
ALBUMIN/GLOB SERPL: 2 G/DL
ALBUMIN/GLOB SERPL: 2.2 G/DL
ALP SERPL-CCNC: 90 U/L (ref 39–117)
ALP SERPL-CCNC: 96 U/L (ref 39–117)
ALT SERPL W P-5'-P-CCNC: 33 U/L (ref 1–41)
ALT SERPL W P-5'-P-CCNC: 36 U/L (ref 1–41)
ANION GAP SERPL CALCULATED.3IONS-SCNC: 10.6 MMOL/L (ref 5–15)
ANION GAP SERPL CALCULATED.3IONS-SCNC: 12.4 MMOL/L (ref 5–15)
AST SERPL-CCNC: 15 U/L (ref 1–40)
AST SERPL-CCNC: 20 U/L (ref 1–40)
BASOPHILS # BLD AUTO: 0.06 10*3/MM3 (ref 0–0.2)
BASOPHILS # BLD AUTO: 0.06 10*3/MM3 (ref 0–0.2)
BASOPHILS NFR BLD AUTO: 0.8 % (ref 0–1.5)
BASOPHILS NFR BLD AUTO: 0.8 % (ref 0–1.5)
BILIRUB SERPL-MCNC: 0.8 MG/DL (ref 0–1.2)
BILIRUB SERPL-MCNC: 1.2 MG/DL (ref 0–1.2)
BUN SERPL-MCNC: 11 MG/DL (ref 6–20)
BUN SERPL-MCNC: 12 MG/DL (ref 6–20)
BUN/CREAT SERPL: 11.4 (ref 7–25)
BUN/CREAT SERPL: 12 (ref 7–25)
CALCIUM SPEC-SCNC: 8.9 MG/DL (ref 8.6–10.5)
CALCIUM SPEC-SCNC: 9.4 MG/DL (ref 8.6–10.5)
CHLORIDE SERPL-SCNC: 102 MMOL/L (ref 98–107)
CHLORIDE SERPL-SCNC: 103 MMOL/L (ref 98–107)
CHOLEST SERPL-MCNC: NORMAL MG/DL
CO2 SERPL-SCNC: 25.6 MMOL/L (ref 22–29)
CO2 SERPL-SCNC: 26.4 MMOL/L (ref 22–29)
CREAT SERPL-MCNC: 0.92 MG/DL (ref 0.76–1.27)
CREAT SERPL-MCNC: 1.05 MG/DL (ref 0.76–1.27)
DEPRECATED RDW RBC AUTO: 38.2 FL (ref 37–54)
DEPRECATED RDW RBC AUTO: 38.6 FL (ref 37–54)
EGFRCR SERPLBLD CKD-EPI 2021: 105.8 ML/MIN/1.73
EGFRCR SERPLBLD CKD-EPI 2021: 90.3 ML/MIN/1.73
EOSINOPHIL # BLD AUTO: 0.12 10*3/MM3 (ref 0–0.4)
EOSINOPHIL # BLD AUTO: 0.25 10*3/MM3 (ref 0–0.4)
EOSINOPHIL NFR BLD AUTO: 1.6 % (ref 0.3–6.2)
EOSINOPHIL NFR BLD AUTO: 3.3 % (ref 0.3–6.2)
ERYTHROCYTE [DISTWIDTH] IN BLOOD BY AUTOMATED COUNT: 12.3 % (ref 12.3–15.4)
ERYTHROCYTE [DISTWIDTH] IN BLOOD BY AUTOMATED COUNT: 12.5 % (ref 12.3–15.4)
GLOBULIN UR ELPH-MCNC: 2 GM/DL
GLOBULIN UR ELPH-MCNC: 2.2 GM/DL
GLUCOSE BLDC GLUCOMTR-MCNC: 99 MG/DL (ref 70–130)
GLUCOSE SERPL-MCNC: 101 MG/DL (ref 65–99)
GLUCOSE SERPL-MCNC: 108 MG/DL (ref 65–99)
HBA1C MFR BLD: 4.9 % (ref 4.8–5.6)
HCT VFR BLD AUTO: 42.1 % (ref 37.5–51)
HCT VFR BLD AUTO: 45.3 % (ref 37.5–51)
HDLC SERPL-MCNC: NORMAL MG/DL
HGB BLD-MCNC: 13.9 G/DL (ref 13–17.7)
HGB BLD-MCNC: 15.2 G/DL (ref 13–17.7)
HOLD SPECIMEN: NORMAL
HOLD SPECIMEN: NORMAL
IMM GRANULOCYTES # BLD AUTO: 0.02 10*3/MM3 (ref 0–0.05)
IMM GRANULOCYTES # BLD AUTO: 0.02 10*3/MM3 (ref 0–0.05)
IMM GRANULOCYTES NFR BLD AUTO: 0.3 % (ref 0–0.5)
IMM GRANULOCYTES NFR BLD AUTO: 0.3 % (ref 0–0.5)
LDLC SERPL CALC-MCNC: NORMAL MG/DL
LDLC/HDLC SERPL: NORMAL {RATIO}
LYMPHOCYTES # BLD AUTO: 1.89 10*3/MM3 (ref 0.7–3.1)
LYMPHOCYTES # BLD AUTO: 2.79 10*3/MM3 (ref 0.7–3.1)
LYMPHOCYTES NFR BLD AUTO: 26 % (ref 19.6–45.3)
LYMPHOCYTES NFR BLD AUTO: 36.5 % (ref 19.6–45.3)
MAGNESIUM SERPL-MCNC: 2 MG/DL (ref 1.6–2.6)
MCH RBC QN AUTO: 28.3 PG (ref 26.6–33)
MCH RBC QN AUTO: 28.6 PG (ref 26.6–33)
MCHC RBC AUTO-ENTMCNC: 33 G/DL (ref 31.5–35.7)
MCHC RBC AUTO-ENTMCNC: 33.6 G/DL (ref 31.5–35.7)
MCV RBC AUTO: 85.3 FL (ref 79–97)
MCV RBC AUTO: 85.7 FL (ref 79–97)
MONOCYTES # BLD AUTO: 0.47 10*3/MM3 (ref 0.1–0.9)
MONOCYTES # BLD AUTO: 0.51 10*3/MM3 (ref 0.1–0.9)
MONOCYTES NFR BLD AUTO: 6.5 % (ref 5–12)
MONOCYTES NFR BLD AUTO: 6.7 % (ref 5–12)
NEUTROPHILS NFR BLD AUTO: 4.01 10*3/MM3 (ref 1.7–7)
NEUTROPHILS NFR BLD AUTO: 4.72 10*3/MM3 (ref 1.7–7)
NEUTROPHILS NFR BLD AUTO: 52.4 % (ref 42.7–76)
NEUTROPHILS NFR BLD AUTO: 64.8 % (ref 42.7–76)
NRBC BLD AUTO-RTO: 0 /100 WBC (ref 0–0.2)
NRBC BLD AUTO-RTO: 0 /100 WBC (ref 0–0.2)
PHOSPHATE SERPL-MCNC: 3.2 MG/DL (ref 2.5–4.5)
PLATELET # BLD AUTO: 258 10*3/MM3 (ref 140–450)
PLATELET # BLD AUTO: 267 10*3/MM3 (ref 140–450)
PMV BLD AUTO: 10 FL (ref 6–12)
PMV BLD AUTO: 10.1 FL (ref 6–12)
POTASSIUM SERPL-SCNC: 3.7 MMOL/L (ref 3.5–5.2)
POTASSIUM SERPL-SCNC: 4 MMOL/L (ref 3.5–5.2)
PROT SERPL-MCNC: 6.3 G/DL (ref 6–8.5)
PROT SERPL-MCNC: 6.7 G/DL (ref 6–8.5)
QT INTERVAL: 322 MS
QTC INTERVAL: 432 MS
RBC # BLD AUTO: 4.91 10*6/MM3 (ref 4.14–5.8)
RBC # BLD AUTO: 5.31 10*6/MM3 (ref 4.14–5.8)
SODIUM SERPL-SCNC: 140 MMOL/L (ref 136–145)
SODIUM SERPL-SCNC: 140 MMOL/L (ref 136–145)
TRIGL SERPL-MCNC: NORMAL MG/DL
TROPONIN T SERPL HS-MCNC: 8 NG/L
TROPONIN T SERPL HS-MCNC: <6 NG/L
VLDLC SERPL-MCNC: NORMAL MG/DL
WBC NRBC COR # BLD AUTO: 7.28 10*3/MM3 (ref 3.4–10.8)
WBC NRBC COR # BLD AUTO: 7.64 10*3/MM3 (ref 3.4–10.8)
WHOLE BLOOD HOLD COAG: NORMAL
WHOLE BLOOD HOLD SPECIMEN: NORMAL

## 2024-03-02 PROCEDURE — 93016 CV STRESS TEST SUPVJ ONLY: CPT | Performed by: INTERNAL MEDICINE

## 2024-03-02 PROCEDURE — 71045 X-RAY EXAM CHEST 1 VIEW: CPT

## 2024-03-02 PROCEDURE — 71275 CT ANGIOGRAPHY CHEST: CPT

## 2024-03-02 PROCEDURE — 84484 ASSAY OF TROPONIN QUANT: CPT | Performed by: PHYSICIAN ASSISTANT

## 2024-03-02 PROCEDURE — 85025 COMPLETE CBC W/AUTO DIFF WBC: CPT | Performed by: NURSE PRACTITIONER

## 2024-03-02 PROCEDURE — 84484 ASSAY OF TROPONIN QUANT: CPT

## 2024-03-02 PROCEDURE — 83036 HEMOGLOBIN GLYCOSYLATED A1C: CPT | Performed by: NURSE PRACTITIONER

## 2024-03-02 PROCEDURE — 82948 REAGENT STRIP/BLOOD GLUCOSE: CPT

## 2024-03-02 PROCEDURE — G0378 HOSPITAL OBSERVATION PER HR: HCPCS

## 2024-03-02 PROCEDURE — 0 DIATRIZOATE MEGLUMINE & SODIUM PER 1 ML

## 2024-03-02 PROCEDURE — 85025 COMPLETE CBC W/AUTO DIFF WBC: CPT

## 2024-03-02 PROCEDURE — 80053 COMPREHEN METABOLIC PANEL: CPT | Performed by: NURSE PRACTITIONER

## 2024-03-02 PROCEDURE — 83735 ASSAY OF MAGNESIUM: CPT | Performed by: NURSE PRACTITIONER

## 2024-03-02 PROCEDURE — 80061 LIPID PANEL: CPT | Performed by: NURSE PRACTITIONER

## 2024-03-02 PROCEDURE — 93018 CV STRESS TEST I&R ONLY: CPT | Performed by: INTERNAL MEDICINE

## 2024-03-02 PROCEDURE — 93017 CV STRESS TEST TRACING ONLY: CPT

## 2024-03-02 PROCEDURE — 80053 COMPREHEN METABOLIC PANEL: CPT

## 2024-03-02 PROCEDURE — 74178 CT ABD&PLV WO CNTR FLWD CNTR: CPT

## 2024-03-02 PROCEDURE — 99204 OFFICE O/P NEW MOD 45 MIN: CPT | Performed by: INTERNAL MEDICINE

## 2024-03-02 PROCEDURE — 25510000001 IOPAMIDOL PER 1 ML: Performed by: INTERNAL MEDICINE

## 2024-03-02 PROCEDURE — 84100 ASSAY OF PHOSPHORUS: CPT | Performed by: NURSE PRACTITIONER

## 2024-03-02 PROCEDURE — 36415 COLL VENOUS BLD VENIPUNCTURE: CPT | Performed by: NURSE PRACTITIONER

## 2024-03-02 RX ORDER — NITROGLYCERIN 0.4 MG/1
0.4 TABLET SUBLINGUAL
Status: DISCONTINUED | OUTPATIENT
Start: 2024-03-02 | End: 2024-03-03 | Stop reason: HOSPADM

## 2024-03-02 RX ORDER — NICOTINE 21 MG/24HR
1 PATCH, TRANSDERMAL 24 HOURS TRANSDERMAL
Status: DISCONTINUED | OUTPATIENT
Start: 2024-03-02 | End: 2024-03-03 | Stop reason: HOSPADM

## 2024-03-02 RX ORDER — ONDANSETRON 2 MG/ML
4 INJECTION INTRAMUSCULAR; INTRAVENOUS EVERY 6 HOURS PRN
Status: DISCONTINUED | OUTPATIENT
Start: 2024-03-02 | End: 2024-03-03 | Stop reason: HOSPADM

## 2024-03-02 RX ORDER — AMOXICILLIN 250 MG
2 CAPSULE ORAL 2 TIMES DAILY PRN
Status: DISCONTINUED | OUTPATIENT
Start: 2024-03-02 | End: 2024-03-03 | Stop reason: HOSPADM

## 2024-03-02 RX ORDER — ACETAMINOPHEN 160 MG/5ML
650 SOLUTION ORAL EVERY 4 HOURS PRN
Status: DISCONTINUED | OUTPATIENT
Start: 2024-03-02 | End: 2024-03-03 | Stop reason: HOSPADM

## 2024-03-02 RX ORDER — POLYETHYLENE GLYCOL 3350 17 G/17G
17 POWDER, FOR SOLUTION ORAL DAILY PRN
Status: DISCONTINUED | OUTPATIENT
Start: 2024-03-02 | End: 2024-03-03 | Stop reason: HOSPADM

## 2024-03-02 RX ORDER — ACETAMINOPHEN 500 MG
1000 TABLET ORAL ONCE
Status: DISCONTINUED | OUTPATIENT
Start: 2024-03-02 | End: 2024-03-03 | Stop reason: HOSPADM

## 2024-03-02 RX ORDER — NITROGLYCERIN 0.4 MG/1
0.4 TABLET SUBLINGUAL
Status: DISCONTINUED | OUTPATIENT
Start: 2024-03-02 | End: 2024-03-02

## 2024-03-02 RX ORDER — BISACODYL 10 MG
10 SUPPOSITORY, RECTAL RECTAL DAILY PRN
Status: DISCONTINUED | OUTPATIENT
Start: 2024-03-02 | End: 2024-03-03 | Stop reason: HOSPADM

## 2024-03-02 RX ORDER — BISACODYL 5 MG/1
5 TABLET, DELAYED RELEASE ORAL DAILY PRN
Status: DISCONTINUED | OUTPATIENT
Start: 2024-03-02 | End: 2024-03-03 | Stop reason: HOSPADM

## 2024-03-02 RX ORDER — ACETAMINOPHEN 325 MG/1
650 TABLET ORAL EVERY 4 HOURS PRN
Status: DISCONTINUED | OUTPATIENT
Start: 2024-03-02 | End: 2024-03-03 | Stop reason: HOSPADM

## 2024-03-02 RX ORDER — ACETAMINOPHEN 650 MG/1
650 SUPPOSITORY RECTAL EVERY 4 HOURS PRN
Status: DISCONTINUED | OUTPATIENT
Start: 2024-03-02 | End: 2024-03-03 | Stop reason: HOSPADM

## 2024-03-02 RX ORDER — SODIUM CHLORIDE 0.9 % (FLUSH) 0.9 %
10 SYRINGE (ML) INJECTION EVERY 12 HOURS SCHEDULED
Status: DISCONTINUED | OUTPATIENT
Start: 2024-03-02 | End: 2024-03-03 | Stop reason: HOSPADM

## 2024-03-02 RX ORDER — SODIUM CHLORIDE 0.9 % (FLUSH) 0.9 %
10 SYRINGE (ML) INJECTION AS NEEDED
Status: DISCONTINUED | OUTPATIENT
Start: 2024-03-02 | End: 2024-03-03 | Stop reason: HOSPADM

## 2024-03-02 RX ORDER — SODIUM CHLORIDE 9 MG/ML
40 INJECTION, SOLUTION INTRAVENOUS AS NEEDED
Status: DISCONTINUED | OUTPATIENT
Start: 2024-03-02 | End: 2024-03-03 | Stop reason: HOSPADM

## 2024-03-02 RX ADMIN — Medication 10 ML: at 12:03

## 2024-03-02 RX ADMIN — DIATRIZOATE MEGLUMINE AND DIATRIZOATE SODIUM 30 ML: 660; 100 LIQUID ORAL; RECTAL at 16:14

## 2024-03-02 RX ADMIN — IOPAMIDOL 95 ML: 755 INJECTION, SOLUTION INTRAVENOUS at 17:33

## 2024-03-02 NOTE — ED PROVIDER NOTES
EMERGENCY DEPARTMENT ENCOUNTER  Room Number:  05/05  PCP: Letty Hernández APRN  Independent Historians: Patient      HPI:  Chief Complaint: had concerns including Chest Pain and Headache.     A complete HPI/ROS/PMH/PSH/SH/FH are unobtainable due to: None    Chronic or social conditions impacting patient care (Social Determinants of Health): None      Context: Héctor Crabtree is a 43 y.o. male with a medical history of anxiety, GERD, hyperlipidemia, and tobacco abuse who presents emergency department today complaining of left-sided chest pain that woke him up from sleep.  Patient describes the pain as a pressure and left-sided to his chest which is associated with diaphoresis and nausea.  He says the pain radiated to his shoulder.  He says he took 4 aspirin with no relief and then took his father's nitroglycerin at home and says he got significant relief of pain.  He says that he now has a headache due to the nitroglycerin and is still having chest pain.  He denies any feels short of breath.  He denies a cardiac history but says he does have a significant family cardiac history.  He says that his father had his first MI when he was in his 40s and has about 18 cardiac stents.  The patient has never had a cardiac workup.  He does have hyperlipidemia and smokes cigarettes denies a history of hypertension or diabetes.      Review of prior external notes (non-ED) -and- Review of prior external test results outside of this encounter:   I reviewed CBC, CMP, D-dimer, troponin from 1/14/2023    PAST MEDICAL HISTORY  Active Ambulatory Problems     Diagnosis Date Noted    Cervical lymphadenopathy 12/02/2015    Gastroesophageal reflux disease 11/25/2017    High risk sexual behavior 08/09/2016    Inhales drugs 08/09/2016    Multiple nevi 03/01/2021    Neck pain 08/14/2017    Right sided sciatica 01/01/2008    Tobacco user 07/18/2019    Aphthous ulcer of mouth 07/18/2019    Anxiety state 11/25/2017    Epigastric pain  03/01/2021    Esophageal dysphagia 03/01/2021    Dyspepsia 03/01/2021    Right lower quadrant abdominal pain 03/01/2021    Family history of polyps in the colon 03/01/2021    Family history of colon cancer 03/01/2021    Mid back pain on left side 02/29/2024    Arm paresthesia, left 02/29/2024    Left arm pain 02/29/2024     Resolved Ambulatory Problems     Diagnosis Date Noted    No Resolved Ambulatory Problems     Past Medical History:   Diagnosis Date    Abdominal pain     CTS (carpal tunnel syndrome) 2010    GERD (gastroesophageal reflux disease)     Hyperlipidemia     Low back pain 2010         PAST SURGICAL HISTORY  Past Surgical History:   Procedure Laterality Date    CHOLECYSTECTOMY      COLONOSCOPY N/A 04/06/2021    Procedure: COLONOSCOPY TO CECUM/TI WITH HOT SNARE POLYPECTOMIES AND COLD SNARE POLYPECTOMY;  Surgeon: Master Dubon MD;  Location: [facility] ENDOSCOPY;  Service: Gastroenterology;  Laterality: N/A;  pre: FAMILY HX OF COLON POLYPS AND COLON CANCER  post: NORMAL TI, POLYPS, HEMORRHOIDS    ENDOSCOPY N/A 04/06/2021    Procedure: ESOPHAGOGASTRODUODENOSCOPY WITH COLD BX'S AND ESOPHAGEAL DILATION WITH 54 MM BENITES;  Surgeon: Master Dubon MD;  Location: [facility] ENDOSCOPY;  Service: Gastroenterology;  Laterality: N/A;  pre: GERD, ABDOMINAL PAIN  post: NORMAL    EXCISION MASS HEAD/NECK  2007    WISDOM TOOTH EXTRACTION           FAMILY HISTORY  Family History   Problem Relation Age of Onset    Colon polyps Father     Kidney failure Father     Crohn's disease Paternal Grandmother     Malig Hyperthermia Neg Hx          SOCIAL HISTORY  Social History     Socioeconomic History    Marital status: Single   Tobacco Use    Smoking status: Every Day     Types: Electronic Cigarette    Smokeless tobacco: Former    Tobacco comments:     Vapes   Vaping Use    Vaping status: Every Day    Substances: Nicotine    Devices: Pre-filled or refillable cartridge, Refillable tank   Substance and Sexual Activity    Alcohol  use: Not Currently    Drug use: Yes     Types: Marijuana    Sexual activity: Defer         ALLERGIES  Patient has no known allergies.      REVIEW OF SYSTEMS  Included in HPI  All systems reviewed and negative except for those discussed in HPI.      PHYSICAL EXAM    I have reviewed the triage vital signs and nursing notes.    ED Triage Vitals   Temp Heart Rate Resp BP SpO2   03/01/24 2347 03/01/24 2347 03/01/24 2347 03/01/24 2354 03/01/24 2347   97.7 °F (36.5 °C) (!) 125 16 (!) 147/107 100 %      Temp src Heart Rate Source Patient Position BP Location FiO2 (%)   -- -- -- -- --              Physical Exam  Constitutional:       Appearance: Normal appearance.   HENT:      Head: Normocephalic and atraumatic.      Mouth/Throat:      Mouth: Mucous membranes are moist.   Eyes:      Extraocular Movements: Extraocular movements intact.      Pupils: Pupils are equal, round, and reactive to light.   Cardiovascular:      Rate and Rhythm: Normal rate and regular rhythm.      Pulses: Normal pulses.      Heart sounds: Normal heart sounds.   Pulmonary:      Effort: Pulmonary effort is normal. No respiratory distress.      Breath sounds: Normal breath sounds.   Abdominal:      General: Abdomen is flat. There is no distension.      Palpations: Abdomen is soft.      Tenderness: There is no abdominal tenderness.   Musculoskeletal:         General: Normal range of motion.      Cervical back: Normal range of motion and neck supple.   Skin:     General: Skin is warm and dry.      Capillary Refill: Capillary refill takes less than 2 seconds.   Neurological:      General: No focal deficit present.      Mental Status: He is alert and oriented to person, place, and time.   Psychiatric:         Mood and Affect: Mood normal.         Behavior: Behavior normal.             LAB RESULTS  Recent Results (from the past 24 hour(s))   ECG 12 Lead ED Triage Standing Order; Chest Pain    Collection Time: 03/01/24 11:51 PM   Result Value Ref Range    QT  Interval 322 ms    QTC Interval 432 ms   Comprehensive Metabolic Panel    Collection Time: 03/02/24 12:13 AM    Specimen: Blood   Result Value Ref Range    Glucose 108 (H) 65 - 99 mg/dL    BUN 12 6 - 20 mg/dL    Creatinine 1.05 0.76 - 1.27 mg/dL    Sodium 140 136 - 145 mmol/L    Potassium 3.7 3.5 - 5.2 mmol/L    Chloride 102 98 - 107 mmol/L    CO2 25.6 22.0 - 29.0 mmol/L    Calcium 9.4 8.6 - 10.5 mg/dL    Total Protein 6.7 6.0 - 8.5 g/dL    Albumin 4.5 3.5 - 5.2 g/dL    ALT (SGPT) 36 1 - 41 U/L    AST (SGOT) 15 1 - 40 U/L    Alkaline Phosphatase 96 39 - 117 U/L    Total Bilirubin 0.8 0.0 - 1.2 mg/dL    Globulin 2.2 gm/dL    A/G Ratio 2.0 g/dL    BUN/Creatinine Ratio 11.4 7.0 - 25.0    Anion Gap 12.4 5.0 - 15.0 mmol/L    eGFR 90.3 >60.0 mL/min/1.73   High Sensitivity Troponin T    Collection Time: 03/02/24 12:13 AM    Specimen: Blood   Result Value Ref Range    HS Troponin T 8 <22 ng/L   Green Top (Gel)    Collection Time: 03/02/24 12:13 AM   Result Value Ref Range    Extra Tube Hold for add-ons.    Lavender Top    Collection Time: 03/02/24 12:13 AM   Result Value Ref Range    Extra Tube hold for add-on    Gold Top - SST    Collection Time: 03/02/24 12:13 AM   Result Value Ref Range    Extra Tube Hold for add-ons.    Light Blue Top    Collection Time: 03/02/24 12:13 AM   Result Value Ref Range    Extra Tube Hold for add-ons.    CBC Auto Differential    Collection Time: 03/02/24 12:13 AM    Specimen: Blood   Result Value Ref Range    WBC 7.28 3.40 - 10.80 10*3/mm3    RBC 5.31 4.14 - 5.80 10*6/mm3    Hemoglobin 15.2 13.0 - 17.7 g/dL    Hematocrit 45.3 37.5 - 51.0 %    MCV 85.3 79.0 - 97.0 fL    MCH 28.6 26.6 - 33.0 pg    MCHC 33.6 31.5 - 35.7 g/dL    RDW 12.5 12.3 - 15.4 %    RDW-SD 38.6 37.0 - 54.0 fl    MPV 10.1 6.0 - 12.0 fL    Platelets 267 140 - 450 10*3/mm3    Neutrophil % 64.8 42.7 - 76.0 %    Lymphocyte % 26.0 19.6 - 45.3 %    Monocyte % 6.5 5.0 - 12.0 %    Eosinophil % 1.6 0.3 - 6.2 %    Basophil % 0.8 0.0 -  1.5 %    Immature Grans % 0.3 0.0 - 0.5 %    Neutrophils, Absolute 4.72 1.70 - 7.00 10*3/mm3    Lymphocytes, Absolute 1.89 0.70 - 3.10 10*3/mm3    Monocytes, Absolute 0.47 0.10 - 0.90 10*3/mm3    Eosinophils, Absolute 0.12 0.00 - 0.40 10*3/mm3    Basophils, Absolute 0.06 0.00 - 0.20 10*3/mm3    Immature Grans, Absolute 0.02 0.00 - 0.05 10*3/mm3    nRBC 0.0 0.0 - 0.2 /100 WBC   Single High Sensitivity Troponin T    Collection Time: 03/02/24  3:56 AM    Specimen: Blood   Result Value Ref Range    HS Troponin T <6 <22 ng/L         RADIOLOGY  XR Chest 1 View    Result Date: 3/2/2024  SINGLE VIEW OF THE CHEST  HISTORY: Chest pain  COMPARISON: January 14, 2023  FINDINGS: Heart size is within normal limits. No pneumothorax, pleural effusion, or acute infiltrate is seen.      No acute findings.  This report was finalized on 3/2/2024 12:49 AM by Dr. Yuliet Fallon M.D on Workstation: [id number]         MEDICATIONS GIVEN IN ER  Medications   sodium chloride 0.9 % flush 10 mL (has no administration in time range)   aspirin tablet 325 mg (325 mg Oral Not Given 3/2/24 0222)   acetaminophen (TYLENOL) tablet 1,000 mg (0 mg Oral Hold 3/2/24 0222)   sodium chloride 0.9 % flush 10 mL (has no administration in time range)   sodium chloride 0.9 % flush 10 mL (has no administration in time range)   sodium chloride 0.9 % infusion 40 mL (has no administration in time range)   nitroglycerin (NITROSTAT) SL tablet 0.4 mg (has no administration in time range)   acetaminophen (TYLENOL) tablet 650 mg (has no administration in time range)     Or   acetaminophen (TYLENOL) 160 MG/5ML oral solution 650 mg (has no administration in time range)     Or   acetaminophen (TYLENOL) suppository 650 mg (has no administration in time range)   sennosides-docusate (PERICOLACE) 8.6-50 MG per tablet 2 tablet (has no administration in time range)     And   polyethylene glycol (MIRALAX) packet 17 g (has no administration in time range)     And   bisacodyl  (DULCOLAX) EC tablet 5 mg (has no administration in time range)     And   bisacodyl (DULCOLAX) suppository 10 mg (has no administration in time range)   ondansetron (ZOFRAN) injection 4 mg (has no administration in time range)           OUTPATIENT MEDICATION MANAGEMENT:  Current Facility-Administered Medications Ordered in Epic   Medication Dose Route Frequency Provider Last Rate Last Admin    acetaminophen (TYLENOL) tablet 650 mg  650 mg Oral Q4H PRN Roula Blount APRN        Or    acetaminophen (TYLENOL) 160 MG/5ML oral solution 650 mg  650 mg Oral Q4H PRN Roula Blount APRN        Or    acetaminophen (TYLENOL) suppository 650 mg  650 mg Rectal Q4H PRN Roula Blount APRN        acetaminophen (TYLENOL) tablet 1,000 mg  1,000 mg Oral Once Pricilla Diaz PA-C        aspirin tablet 325 mg  325 mg Oral Once Emergency, Triage Protocol, MD        sennosides-docusate (PERICOLACE) 8.6-50 MG per tablet 2 tablet  2 tablet Oral BID PRN Roula Blount APRN        And    polyethylene glycol (MIRALAX) packet 17 g  17 g Oral Daily PRN Roula Blount APRN        And    bisacodyl (DULCOLAX) EC tablet 5 mg  5 mg Oral Daily PRN Roula Blount APRN        And    bisacodyl (DULCOLAX) suppository 10 mg  10 mg Rectal Daily PRN Roula Blount APRN        nitroglycerin (NITROSTAT) SL tablet 0.4 mg  0.4 mg Sublingual Q5 Min PRN Roula Blount APRN        ondansetron (ZOFRAN) injection 4 mg  4 mg Intravenous Q6H PRN Roula Blount APRN        sodium chloride 0.9 % flush 10 mL  10 mL Intravenous PRN Emergency, Triage Protocol, MD        sodium chloride 0.9 % flush 10 mL  10 mL Intravenous Q12H Roula Blount APRN        sodium chloride 0.9 % flush 10 mL  10 mL Intravenous PRN Roula Blount APRN        sodium chloride 0.9 % infusion 40 mL  40 mL Intravenous PRN Roula Blount APRN         Current Outpatient Medications Ordered in Epic   Medication Sig Dispense  Refill    calcium carbonate (TUMS) 500 MG chewable tablet Chew 1 tablet Daily.      dicyclomine (BENTYL) 10 MG capsule       fluticasone (FLONASE) 50 MCG/ACT nasal spray 2 sprays into the nostril(s) as directed by provider Daily.      hyoscyamine (LEVSIN) 0.125 MG SL tablet Take 1 tablet by mouth 3 (Three) Times a Day As Needed for Cramping. 120 tablet 5    Lidocaine Viscous HCl (XYLOCAINE) 2 % solution Take 5 mL by mouth 2 (Two) Times a Day As Needed for Mild Pain (As needed for esophageal pain). 100 mL 0    ultra-purified peppermint oil (IBGARD) 90 mg capsule capsule Take 2 capsules by mouth 3 (Three) Times a Day As Needed (Abdominal discomfort gas and bloating) for up to 10 doses. 10 capsule 0           PROGRESS, DATA ANALYSIS, CONSULTS, AND MEDICAL DECISION MAKING  ORDERS PLACED DURING THIS VISIT:  Orders Placed This Encounter   Procedures    XR Chest 1 View    [place] Draw    Comprehensive Metabolic Panel    High Sensitivity Troponin T    CBC Auto Differential    Single High Sensitivity Troponin T    CBC Auto Differential    Comprehensive Metabolic Panel    Hemoglobin A1c    Magnesium    Phosphorus    Lipid Panel    NPO Diet NPO Type: Sips with Meds    Undress & Gown    Vital Signs    Intake & Output    Weigh Patient    Oral Care    Place Sequential Compression Device    Maintain Sequential Compression Device    Telemetry - Maintain IV Access    Telemetry - Place Orders & Notify Provider of Results When Patient Experiences Acute Chest Pain, Dysrhythmia or Respiratory Distress    May Be Off Telemetry for Tests    Pulse Oximetry, Continuous    Up With Assistance    Code Status and Medical Interventions:    [facility] (on-call MD unless specified) Details    Inpatient Cardiology Consult    Oxygen Therapy- Nasal Cannula; Titrate 1-6 LPM Per SpO2; 90 - 95%    ECG 12 Lead ED Triage Standing Order; Chest Pain    ECG 12 Lead ED Triage Standing Order; Chest Pain    Insert Peripheral IV    Insert Peripheral IV    Initiate  Observation Status    CBC & Differential    Green Top (Gel)    Lavender Top    Gold Top - SST    Light Blue Top       All labs have been independently interpreted by me.  All radiology studies have been reviewed by me. All EKG's have been independently viewed and interpreted by me.  Discussion below represents my analysis of pertinent findings related to patient's condition, differential diagnosis, treatment plan and final disposition.    Differential diagnosis includes but is not limited to:   My differential diagnosis for chest pain includes but is not limited to:  Muscle strain, costochondritis, myositis, pleurisy, rib fracture, intercostal neuritis, herpes zoster, tumor, myocardial infarction, coronary syndrome, unstable angina, angina, aortic dissection, mitral valve prolapse, pericarditis, palpitations, pulmonary embolus, pneumonia, pneumothorax, lung cancer, GERD, esophagitis, esophageal spasm      ED Course:  ED Course as of 03/02/24 0550   Sat Mar 02, 2024   0039 I discussed the case with Dr. Clark and they agree to evaluate the patient at the bedside.    [CC]   0101 HS Troponin T: 8 [CC]   0311 EKG performed at 2351 and interpreted by me: Sinus tachycardia with a rate of 108, normal axis,  no significant ST elevation or depression [CC]   0312 XR Chest 1 View  My independent interpretation of the chest x-ray is no acute infiltrate [CC]   0312 I rechecked the patient.  I discussed the patient's labs, radiology findings (including all incidental findings), diagnosis, and plan for admission. The patient understands and agrees with the plan.   [CC]   0427 HS Troponin T: <6 [CC]   0537 Spoke with YUDELKA Celeste with [facility].  Reviewed history, exam, results, treatments.  She agrees admit the patient to Dr. Jacobo.   [CC]      ED Course User Index  [CC] Pricilla Diaz PA-C           AS OF 05:50 EST VITALS:    BP - 130/92  HR - 71  TEMP - 97.7 °F (36.5 °C)  O2 SATS - 98%    Clinical Scores:   HEART Score:  3      MDM:  Patient is a 43-year-old male with a history of obesity, hyperlipidemia, and smoking who presents emergency department with left-sided chest pain.  On arrival here in the emergency department patient is mildly tachycardic but vitals otherwise reassuring, he is afebrile.  On my exam the patient is in no acute distress and cardiopulmonary exam is benign.  Patient was evaluated with EKG which shows sinus tachycardia but no ischemic changes.  He was evaluated with troponin which is negative.  Given the patient's ongoing chest pain and significant cardiac history I have concern for a cardiac cause of his chest pain.  Patient will require admission to the hospital for further evaluation and management.    COMPLEXITY OF CARE  The patient requires admission.        DIAGNOSIS  Final diagnoses:   Chest pain, unspecified type         DISPOSITION  ED Disposition       ED Disposition   Decision to Admit    Condition   --    Comment   Level of Care: Telemetry [5]   Diagnosis: Chest pain [[id number]]   Admitting Physician: SULMA MORENO [[id number]]   Attending Physician: SULMA MORNEO [[id number]]                        Please note that portions of this document were completed with a voice recognition program.    Note Disclaimer: At [facility], we believe that sharing information builds trust and better relationships. You are receiving this note because you recently visited [facility]. It is possible you will see health information before a provider has talked with you about it. This kind of information can be easy to misunderstand. To help you fully understand what it means for your health, we urge you to discuss this note with your provider.     Pricilla Diaz PA-C  03/02/24 0552

## 2024-03-02 NOTE — NURSING NOTE
I called and spoke with Nuclear Medicine/Cardiovascular lab about the availability of completing the patients treadmill stress test. They informed me that they leave today at 3:30pm and there are more people already scheduled ahead of him. They stated that if it doesn't get done today, then they will complete it tomorrow. I will relay this to the patient.

## 2024-03-02 NOTE — ED PROVIDER NOTES
MD ATTESTATION NOTE      Brief HPI: 43-year-old male who presents the emergency room complaining of chest pain and headache.  The patient describes his chest pain is left-sided that awoke him from sleep and is associated with diaphoresis and nausea.  He states it radiated to his shoulder.  He states he took aspirin and his father's nitroglycerin with marked improvement in his pain.  He states after the nitroglycerin he has headache and has some mild chest discomfort      General : 43 old yo patient is awake alert and oriented  HEENT: NCAT  CV: Heart is regular with no murmurs  Respiratory: CTA bilaterally  Abd: Soft and nontender  Ext: No acute abnormalities  Skin: No rash  Neuro: Awake with a nonfocal neuro exam  Psych: Normal mood and affect      Plan: Will obtain EKG, x-ray and labs for further evaluation    EKG    EKG time: 2351  Rhythm/Rate: Sinus tachycardia 108  Non-Specific ST-T changes inferiorly    Similar compared to prior on 1/14/2023    My independent interpretation of the patient's chest x-ray is no pneumonia or pneumothorax    Patient's initial troponin is normal    Interpreted Contemporaneously by me.  Independently viewed by me    With the patient's significant family history and chest pain relieved with nitroglycerin we will admit him to the hospital for further evaluation and care.    SHARED VISIT: This visit was performed by BOTH a physician and an APC. The substantive portion of the medical decision making was performed by this attesting physician who made or approved the management plan and takes responsibility for patient management. All studies in the APC note (if performed) were independently interpreted by me.         Benito Clark MD  03/02/24 3085

## 2024-03-02 NOTE — ED NOTES
Pt states he does not want to be admitted, pt educated on importance of admission. Attending paged. Awaiting response.   RN cannot approve Refill Request    RN can NOT refill this medication Protocol failed and NO refill given. Last office visit: 1/30/2020 Jasmyne Moctezuma MD Last Physical: 10/3/2019 Last MTM visit: Visit date not found Last visit same specialty: 1/30/2020 Jasmyne Moctezuma MD.  Next visit within 3 mo: Visit date not found  Next physical within 3 mo: Visit date not found      Irma Husain, [facility] Triage/Med Refill 11/25/2020    Requested Prescriptions   Pending Prescriptions Disp Refills     ACCU-CHEK GEORGES PLUS TEST STRP strips [Pharmacy Med Name: ACCU-CHEK GEORGES PLUS STRP Strip] 50 strip 15     Sig: TEST 2 TIMES A DAY       Diabetic Supplies Refill Protocol Failed - 11/24/2020  8:17 AM        Failed - Visit with PCP or prescribing provider visit in last 6 months     Last office visit with prescriber/PCP: 1/30/2020 Jasmyne Moctezuma MD OR same dept: 1/30/2020 Jasmyne Moctezuma MD OR same specialty: 1/30/2020 Jasmyne Moctezuma MD  Last physical: 10/3/2019 Last MTM visit: Visit date not found   Next visit within 3 mo: Visit date not found  Next physical within 3 mo: Visit date not found  Prescriber OR PCP: Jasmyne Moctezuma MD  Last diagnosis associated with med order: 1. Controlled type 2 diabetes mellitus without complication, without long-term current use of insulin (H)  - ACCU-CHEK GEORGES PLUS TEST STRP strips [Pharmacy Med Name: ACCU-CHEK GEORGES PLUS STRP Strip]; TEST 2 TIMES A DAY  Dispense: 50 strip; Refill: 15    If protocol passes may refill for 12 months if within 3 months of last provider visit (or a total of 15 months).             Failed - A1C in last 6 months     Hemoglobin A1c   Date Value Ref Range Status   01/30/2020 8.4 (H) 3.5 - 6.0 % Final

## 2024-03-02 NOTE — H&P
Patient Name:  Héctor Crabtree  YOB: 1980  MRN:  [id number]  Admit Date:  3/1/2024  Patient Care Team:  Letty Hernández APRN as PCP - General (Nurse Practitioner)      Subjective   History Present Illness     Chief Complaint   Patient presents with    Chest Pain    Headache       Patient is a 43-year-old male with no significant past medical history presented to hospital with complaints of chest pain that woke him up from sleep.  Describes as heavy/pressure sensation 8/10 intensity and subsequently took father's nitroglycerin with which he did have relief and pain now lateralized to the left side of his chest and has been constant.  Troponin was less than 6 and EKG with no significant ST or T-wave changes that are acute.  Patient denies any PND, orthopnea, dyspnea on exertion, palpitations, dizziness, cough, sputum production.    Review of Systems     A 12 system review has been performed and they are negative other than mentioned in the H&P    Personal History     Past Medical History:   Diagnosis Date    Abdominal pain     CTS (carpal tunnel syndrome) 2010    GERD (gastroesophageal reflux disease)     Hyperlipidemia     Low back pain 2010     Past Surgical History:   Procedure Laterality Date    CHOLECYSTECTOMY      COLONOSCOPY N/A 04/06/2021    Procedure: COLONOSCOPY TO CECUM/TI WITH HOT SNARE POLYPECTOMIES AND COLD SNARE POLYPECTOMY;  Surgeon: Master Dubon MD;  Location: [facility] ENDOSCOPY;  Service: Gastroenterology;  Laterality: N/A;  pre: FAMILY HX OF COLON POLYPS AND COLON CANCER  post: NORMAL TI, POLYPS, HEMORRHOIDS    ENDOSCOPY N/A 04/06/2021    Procedure: ESOPHAGOGASTRODUODENOSCOPY WITH COLD BX'S AND ESOPHAGEAL DILATION WITH 54 MM BENITES;  Surgeon: Master Dubon MD;  Location: [facility] ENDOSCOPY;  Service: Gastroenterology;  Laterality: N/A;  pre: GERD, ABDOMINAL PAIN  post: NORMAL    EXCISION MASS HEAD/NECK  2007    WISDOM TOOTH EXTRACTION       Family History   Problem  Relation Age of Onset    Colon polyps Father     Kidney failure Father     Crohn's disease Paternal Grandmother     Malig Hyperthermia Neg Hx      Social History     Tobacco Use    Smoking status: Every Day     Types: Electronic Cigarette    Smokeless tobacco: Former    Tobacco comments:     Vapes   Vaping Use    Vaping status: Every Day    Substances: Nicotine    Devices: Pre-filled or refillable cartridge, Refillable tank   Substance Use Topics    Alcohol use: Not Currently    Drug use: Yes     Types: Marijuana     No current facility-administered medications on file prior to encounter.     Current Outpatient Medications on File Prior to Encounter   Medication Sig Dispense Refill    [DISCONTINUED] calcium carbonate (TUMS) 500 MG chewable tablet Chew 1 tablet Daily.      [DISCONTINUED] dicyclomine (BENTYL) 10 MG capsule       [DISCONTINUED] fluticasone (FLONASE) 50 MCG/ACT nasal spray 2 sprays into the nostril(s) as directed by provider Daily.      [DISCONTINUED] hyoscyamine (LEVSIN) 0.125 MG SL tablet Take 1 tablet by mouth 3 (Three) Times a Day As Needed for Cramping. 120 tablet 5    [DISCONTINUED] Lidocaine Viscous HCl (XYLOCAINE) 2 % solution Take 5 mL by mouth 2 (Two) Times a Day As Needed for Mild Pain (As needed for esophageal pain). 100 mL 0    [DISCONTINUED] ultra-purified peppermint oil (IBGARD) 90 mg capsule capsule Take 2 capsules by mouth 3 (Three) Times a Day As Needed (Abdominal discomfort gas and bloating) for up to 10 doses. 10 capsule 0     No Known Allergies    Objective    Objective     Vital Signs  Temp:  [97.4 °F (36.3 °C)-97.8 °F (36.6 °C)] 97.4 °F (36.3 °C)  Heart Rate:  [] 67  Resp:  [16] 16  BP: (121-154)/() 136/88  SpO2:  [96 %-100 %] 98 %  on   ;   Device (Oxygen Therapy): room air  Body mass index is 31.66 kg/m².    Physical Exam   HEENT:  PERRLA, extraocular movements intact, scleras no icterus  Neck: Supple, no JVD   Cardiovascular: Regular rate and rhythm with normal S1  and S2   Respiratory: Fairly clear to auscultation bilaterally with no wheezes  GI:  Soft, nontender, bowel sounds are present   Extremities:  No edema, palpable pedal pulses   Neurologic:  Grossly nonfocal, no facial asymmetry    Results Review:  I reviewed the patient's new clinical results.  I reviewed the patient's new imaging results and agree with the interpretation.  I reviewed the patient's other test results and agree with the interpretation  I personally viewed and interpreted the patient's EKG/Telemetry data  Discussed with ED provider.    Lab Results (last 24 hours)       Procedure Component Value Units Date/Time    CBC & Differential [[id number]]  (Normal) Collected: 03/02/24 0013    Specimen: Blood Updated: 03/02/24 0025    Narrative:      The following orders were created for panel order CBC & Differential.  Procedure                               Abnormality         Status                     ---------                               -----------         ------                     CBC Auto Differential[[id number]]        Normal              Final result                 Please view results for these tests on the individual orders.    Comprehensive Metabolic Panel [[id number]]  (Abnormal) Collected: 03/02/24 0013    Specimen: Blood Updated: 03/02/24 0043     Glucose 108 mg/dL      BUN 12 mg/dL      Creatinine 1.05 mg/dL      Sodium 140 mmol/L      Potassium 3.7 mmol/L      Chloride 102 mmol/L      CO2 25.6 mmol/L      Calcium 9.4 mg/dL      Total Protein 6.7 g/dL      Albumin 4.5 g/dL      ALT (SGPT) 36 U/L      AST (SGOT) 15 U/L      Alkaline Phosphatase 96 U/L      Total Bilirubin 0.8 mg/dL      Globulin 2.2 gm/dL      A/G Ratio 2.0 g/dL      BUN/Creatinine Ratio 11.4     Anion Gap 12.4 mmol/L      eGFR 90.3 mL/min/1.73     Narrative:      GFR Normal >60  Chronic Kidney Disease <60  Kidney Failure <15      High Sensitivity Troponin T [[id number]]  (Normal) Collected: 03/02/24 0013    Specimen: Blood  Updated: 03/02/24 0043     HS Troponin T 8 ng/L     Narrative:      High Sensitive Troponin T Reference Range:  <14.0 ng/L- Negative Female for AMI  <22.0 ng/L- Negative Male for AMI  >=14 - Abnormal Female indicating possible myocardial injury.  >=22 - Abnormal Male indicating possible myocardial injury.   Clinicians would have to utilize clinical acumen, EKG, Troponin, and serial changes to determine if it is an Acute Myocardial Infarction or myocardial injury due to an underlying chronic condition.         CBC Auto Differential [[id number]]  (Normal) Collected: 03/02/24 0013    Specimen: Blood Updated: 03/02/24 0025     WBC 7.28 10*3/mm3      RBC 5.31 10*6/mm3      Hemoglobin 15.2 g/dL      Hematocrit 45.3 %      MCV 85.3 fL      MCH 28.6 pg      MCHC 33.6 g/dL      RDW 12.5 %      RDW-SD 38.6 fl      MPV 10.1 fL      Platelets 267 10*3/mm3      Neutrophil % 64.8 %      Lymphocyte % 26.0 %      Monocyte % 6.5 %      Eosinophil % 1.6 %      Basophil % 0.8 %      Immature Grans % 0.3 %      Neutrophils, Absolute 4.72 10*3/mm3      Lymphocytes, Absolute 1.89 10*3/mm3      Monocytes, Absolute 0.47 10*3/mm3      Eosinophils, Absolute 0.12 10*3/mm3      Basophils, Absolute 0.06 10*3/mm3      Immature Grans, Absolute 0.02 10*3/mm3      nRBC 0.0 /100 WBC     Single High Sensitivity Troponin T [[id number]]  (Normal) Collected: 03/02/24 0356    Specimen: Blood Updated: 03/02/24 0427     HS Troponin T <6 ng/L     Narrative:      High Sensitive Troponin T Reference Range:  <14.0 ng/L- Negative Female for AMI  <22.0 ng/L- Negative Male for AMI  >=14 - Abnormal Female indicating possible myocardial injury.  >=22 - Abnormal Male indicating possible myocardial injury.   Clinicians would have to utilize clinical acumen, EKG, Troponin, and serial changes to determine if it is an Acute Myocardial Infarction or myocardial injury due to an underlying chronic condition.         CBC Auto Differential [[id number]]  (Normal) Collected:  03/02/24 0608    Specimen: Blood Updated: 03/02/24 0620     WBC 7.64 10*3/mm3      RBC 4.91 10*6/mm3      Hemoglobin 13.9 g/dL      Hematocrit 42.1 %      MCV 85.7 fL      MCH 28.3 pg      MCHC 33.0 g/dL      RDW 12.3 %      RDW-SD 38.2 fl      MPV 10.0 fL      Platelets 258 10*3/mm3      Neutrophil % 52.4 %      Lymphocyte % 36.5 %      Monocyte % 6.7 %      Eosinophil % 3.3 %      Basophil % 0.8 %      Immature Grans % 0.3 %      Neutrophils, Absolute 4.01 10*3/mm3      Lymphocytes, Absolute 2.79 10*3/mm3      Monocytes, Absolute 0.51 10*3/mm3      Eosinophils, Absolute 0.25 10*3/mm3      Basophils, Absolute 0.06 10*3/mm3      Immature Grans, Absolute 0.02 10*3/mm3      nRBC 0.0 /100 WBC     Hemoglobin A1c [[id number]]  (Normal) Collected: 03/02/24 0608    Specimen: Blood Updated: 03/02/24 0631     Hemoglobin A1C 4.90 %     Narrative:      Hemoglobin A1C Ranges:    Increased Risk for Diabetes  5.7% to 6.4%  Diabetes                     >= 6.5%  Diabetic Goal                < 7.0%    Lipid Panel [[id number]] Collected: 03/02/24 0608    Specimen: Blood Updated: 03/02/24 0647     Total Cholesterol --     Comment: Questionable results   Corrected result. Previous result was 81 mg/dL on 3/2/2024 at 42 EST.        Triglycerides --     Comment: Questionable results  Corrected result. Previous result was 83 mg/dL on 3/2/2024 at 42 EST.        HDL Cholesterol --     Comment: Questionable results  Corrected result. Previous result was 21 mg/dL on 3/2/2024 at 0642 EST.        LDL Cholesterol  --     Comment: Questionable results  Corrected result. Previous result was 43 mg/dL on 3/2/2024 at 0642 EST.        VLDL Cholesterol --     Comment: Questionable results  Corrected result. Previous result was 17 mg/dL on 3/2/2024 at 0642 EST.        LDL/HDL Ratio --     Comment: Corrected result. Previous result was 2.07 on 3/2/2024 at 0642 EST.       Narrative:      Cholesterol Reference Ranges  (U.S. Department of Health and Human  Services ATP III Classifications)    Desirable          <200 mg/dL  Borderline High    200-239 mg/dL  High Risk          >240 mg/dL      Triglyceride Reference Ranges  (U.S. Department of Health and Human Services ATP III Classifications)    Normal           <150 mg/dL  Borderline High  150-199 mg/dL  High             200-499 mg/dL  Very High        >500 mg/dL    HDL Reference Ranges  (U.S. Department of Health and Human Services ATP III Classifications)    Low     <40 mg/dl (major risk factor for CHD)  High    >60 mg/dl ('negative' risk factor for CHD)        LDL Reference Ranges  (U.S. Department of Health and Human Services ATP III Classifications)    Optimal          <100 mg/dL  Near Optimal     100-129 mg/dL  Borderline High  130-159 mg/dL  High             160-189 mg/dL  Very High        >189 mg/dL    POC Glucose Once [[id number]]  (Normal) Collected: 03/02/24 1007    Specimen: Blood Updated: 03/02/24 1038     Glucose 99 mg/dL     Comprehensive Metabolic Panel [[id number]]  (Abnormal) Collected: 03/02/24 1253    Specimen: Blood Updated: 03/02/24 1351     Glucose 101 mg/dL      BUN 11 mg/dL      Creatinine 0.92 mg/dL      Sodium 140 mmol/L      Potassium 4.0 mmol/L      Chloride 103 mmol/L      CO2 26.4 mmol/L      Calcium 8.9 mg/dL      Total Protein 6.3 g/dL      Albumin 4.3 g/dL      ALT (SGPT) 33 U/L      AST (SGOT) 20 U/L      Alkaline Phosphatase 90 U/L      Total Bilirubin 1.2 mg/dL      Globulin 2.0 gm/dL      A/G Ratio 2.2 g/dL      BUN/Creatinine Ratio 12.0     Anion Gap 10.6 mmol/L      eGFR 105.8 mL/min/1.73     Narrative:      GFR Normal >60  Chronic Kidney Disease <60  Kidney Failure <15      Magnesium [[id number]]  (Normal) Collected: 03/02/24 1253    Specimen: Blood Updated: 03/02/24 1351     Magnesium 2.0 mg/dL     Phosphorus [[id number]]  (Normal) Collected: 03/02/24 1253    Specimen: Blood Updated: 03/02/24 1351     Phosphorus 3.2 mg/dL             Imaging Results (Last 24 Hours)       Procedure  Component Value Units Date/Time    XR Chest 1 View [[id number]] Collected: 03/02/24 0048     Updated: 03/02/24 0159    Narrative:      SINGLE VIEW OF THE CHEST     HISTORY: Chest pain     COMPARISON: January 14, 2023     FINDINGS:  Heart size is within normal limits. No pneumothorax, pleural effusion,  or acute infiltrate is seen.       Impression:      No acute findings.     This report was finalized on 3/2/2024 12:49 AM by Dr. Yuliet Fallon M.D on Workstation: [id number]                   ECG 12 Lead ED Triage Standing Order; Chest Pain   Preliminary Result   HEART RATE= 108  bpm   RR Interval= 556  ms   CA Interval= 158  ms   P Horizontal Axis= -4  deg   P Front Axis= 65  deg   QRSD Interval= 92  ms   QT Interval= 322  ms   QTcB= 432  ms   QRS Axis= 75  deg   T Wave Axis= 61  deg   - OTHERWISE NORMAL ECG -   Sinus tachycardia   Electronically Signed By:    Date and Time of Study: 2024-03-01 23:51:43           Assessment/Plan     Active Hospital Problems    Diagnosis  POA    **Chest pain [R07.9]  Yes      Resolved Hospital Problems   No resolved problems to display.      1. Chest pain with strong family history, plan is for stress test per Cardiology.  CTA of the chest and CT abdomen pelvis will also be obtained to rule out other etiologies for his chest discomfort has now localized to the left side.  2. Vaping, counseled to quit.    3. On SCDs for DVT prophylaxis.  4. Code status is full code.         Dank Rodriguez MD  [facility]  03/02/24  14:34 EST

## 2024-03-02 NOTE — CONSULTS
[facility] Consult Note       Encounter Date:24  Patient:Héctor Crabtree  :1980  MRN:[id number]    Date of Admission: 3/1/2024  Date of Encounter Visit: 24  Encounter Provider: Master Martines MD  Referring Provider: Benito Clark MD  Place of Service: [facility]  Patient Care Team:  Letty Hernández APRN as PCP - General (Nurse Practitioner)      Consulted for: Chest pain     Chief Complaint: Chest pain       History of Presenting Illness:      Mr. Crabtree is 43 y.o. gentleman with past medical history notable for gastric reflux disease, esophageal dysmotility, anxiety, mixed hyperlipidemia, tobacco abuse, and borderline hypertension at times who presents to the emergency room with episode of chest discomfort.  Patient states that he was woken yesterday from sleep with some chest discomfort.  He described it as an itching and burning situation.  She describes it similar to prior presentations with esophageal dysmotility and his indigestion and reflux.  He decided to take one of his dad's nitroglycerin.  It did not really help did give him a headache and did move his discomfort more to his left shoulder.  After which he decided to come to the emergency room to be evaluated.  He has also been having some vague left sided rib pain which has been going on for a few weeks he is actually also following with neurosurgery due to some degenerative disc disease noted on x-rays and does have plans for MRIs as part of his workup.  This morning he is doing well outside of his rib pain.    On arrival to the emergency room he was noted to be fairly tachycardic as well as hypertensive.    Heart rate has improved blood pressure still little borderline elevated but not terrible.  Has not been on any blood pressure medications in the past.  When I look through his chart it seems like his blood pressure ranges from very well-controlled to borderline elevated.  On  occasion he also frequently has mild tachycardia.    He states that he has episodes like this at least once a year he has been evaluated at [facility] and 2021 as well as in our emergency room in 2023 for similar episodes he did have an echo in 2021 has not had any formal cardiac testing since then.      Review of Systems:  Review of Systems   Constitutional: Negative.   HENT: Negative.     Eyes: Negative.    Cardiovascular:  Positive for chest pain.   Respiratory: Negative.     Endocrine: Negative.    Hematologic/Lymphatic: Negative.    Skin: Negative.    Musculoskeletal:  Positive for back pain.   Gastrointestinal:  Positive for dysphagia.   Genitourinary: Negative.    Neurological: Negative.    Psychiatric/Behavioral: Negative.     Allergic/Immunologic: Negative.        Medications:    Current Facility-Administered Medications:     acetaminophen (TYLENOL) tablet 650 mg, 650 mg, Oral, Q4H PRN **OR** acetaminophen (TYLENOL) 160 MG/5ML oral solution 650 mg, 650 mg, Oral, Q4H PRN **OR** acetaminophen (TYLENOL) suppository 650 mg, 650 mg, Rectal, Q4H PRN, Roula Blount APRN    acetaminophen (TYLENOL) tablet 1,000 mg, 1,000 mg, Oral, Once, Pricilla Diaz PA-C    aspirin tablet 325 mg, 325 mg, Oral, Once, Emergency, Triage Protocol, MD    sennosides-docusate (PERICOLACE) 8.6-50 MG per tablet 2 tablet, 2 tablet, Oral, BID PRN **AND** polyethylene glycol (MIRALAX) packet 17 g, 17 g, Oral, Daily PRN **AND** bisacodyl (DULCOLAX) EC tablet 5 mg, 5 mg, Oral, Daily PRN **AND** bisacodyl (DULCOLAX) suppository 10 mg, 10 mg, Rectal, Daily PRN, Roula Blount APRN    nitroglycerin (NITROSTAT) SL tablet 0.4 mg, 0.4 mg, Sublingual, Q5 Min PRN, Roula Blount APRN    ondansetron (ZOFRAN) injection 4 mg, 4 mg, Intravenous, Q6H PRN, Roula Blount APRN    sodium chloride 0.9 % flush 10 mL, 10 mL, Intravenous, PRN, Emergency, Triage Protocol, MD    sodium chloride 0.9 % flush 10 mL, 10 mL, Intravenous,  Q12H, Roula Blount M, APRN    sodium chloride 0.9 % flush 10 mL, 10 mL, Intravenous, PRN, Roula Blount M, APRN    sodium chloride 0.9 % infusion 40 mL, 40 mL, Intravenous, PRN, Roula Blount, APRN    Current Outpatient Medications:     calcium carbonate (TUMS) 500 MG chewable tablet, Chew 1 tablet Daily., Disp: , Rfl:     dicyclomine (BENTYL) 10 MG capsule, , Disp: , Rfl:     fluticasone (FLONASE) 50 MCG/ACT nasal spray, 2 sprays into the nostril(s) as directed by provider Daily., Disp: , Rfl:     hyoscyamine (LEVSIN) 0.125 MG SL tablet, Take 1 tablet by mouth 3 (Three) Times a Day As Needed for Cramping., Disp: 120 tablet, Rfl: 5    Lidocaine Viscous HCl (XYLOCAINE) 2 % solution, Take 5 mL by mouth 2 (Two) Times a Day As Needed for Mild Pain (As needed for esophageal pain)., Disp: 100 mL, Rfl: 0    ultra-purified peppermint oil (IBGARD) 90 mg capsule capsule, Take 2 capsules by mouth 3 (Three) Times a Day As Needed (Abdominal discomfort gas and bloating) for up to 10 doses., Disp: 10 capsule, Rfl: 0    No Known Allergies    Past Medical History:   Diagnosis Date    Abdominal pain     CTS (carpal tunnel syndrome) 2010    GERD (gastroesophageal reflux disease)     Hyperlipidemia     Low back pain 2010       Past Surgical History:   Procedure Laterality Date    CHOLECYSTECTOMY      COLONOSCOPY N/A 04/06/2021    Procedure: COLONOSCOPY TO CECUM/TI WITH HOT SNARE POLYPECTOMIES AND COLD SNARE POLYPECTOMY;  Surgeon: Master Dubon MD;  Location: [facility] ENDOSCOPY;  Service: Gastroenterology;  Laterality: N/A;  pre: FAMILY HX OF COLON POLYPS AND COLON CANCER  post: NORMAL TI, POLYPS, HEMORRHOIDS    ENDOSCOPY N/A 04/06/2021    Procedure: ESOPHAGOGASTRODUODENOSCOPY WITH COLD BX'S AND ESOPHAGEAL DILATION WITH 54 MM BENITES;  Surgeon: Master Dubon MD;  Location: [facility] ENDOSCOPY;  Service: Gastroenterology;  Laterality: N/A;  pre: GERD, ABDOMINAL PAIN  post: NORMAL    EXCISION MASS HEAD/NECK  2007    NATHAN  TOOTH EXTRACTION         Social History     Socioeconomic History    Marital status: Single   Tobacco Use    Smoking status: Every Day     Types: Electronic Cigarette    Smokeless tobacco: Former    Tobacco comments:     Vapes   Vaping Use    Vaping status: Every Day    Substances: Nicotine    Devices: Pre-filled or refillable cartridge, Refillable tank   Substance and Sexual Activity    Alcohol use: Not Currently    Drug use: Yes     Types: Marijuana    Sexual activity: Defer       Family History   Problem Relation Age of Onset    Colon polyps Father     Kidney failure Father     Crohn's disease Paternal Grandmother     Malig Hyperthermia Neg Hx        The following portions of the patient's history were reviewed and updated as appropriate: allergies, current medications, past family history, past medical history, past social history, past surgical history and problem list.         Objective:      Temp:  [97.7 °F (36.5 °C)] 97.7 °F (36.5 °C)  Heart Rate:  [] 71  Resp:  [16] 16  BP: (125-147)/() 126/92   No intake or output data in the 24 hours ending 03/02/24 0636  Body mass index is 31.66 kg/m².      03/01/24  2347   Weight: 109 kg (240 lb)           Physical Exam:  Constitutional: Well appearing, well developed, no acute distress   HENT: Oropharynx clear and membrane moist  Eyes: Normal conjunctiva, no sclera icterus.  Neck: Supple, no carotid bruit bilaterally.  Cardiovascular: Regular rate and rhythm, No Murmur, No bilateral lower extremity edema.  Pulmonary: Normal respiratory effort, normal lung sounds, no wheezing.  Abdominal: Soft, nontender, no hepatosplenomegaly, liver is non-pulsatile.  Neurological: Alert and orient x 3.   Skin: Warm, dry, no ecchymosis, no rash.  Psych: Appropriate mood and affect. Normal judgment and insight.         Lab Review:   Results from last 7 days   Lab Units 03/02/24  0013   SODIUM mmol/L 140   POTASSIUM mmol/L 3.7   CHLORIDE mmol/L 102   CO2 mmol/L 25.6   BUN  "mg/dL 12   CREATININE mg/dL 1.05   GLUCOSE mg/dL 108*   CALCIUM mg/dL 9.4   AST (SGOT) U/L 15   ALT (SGPT) U/L 36     Results from last 7 days   Lab Units 03/02/24  0356 03/02/24  0013   HSTROP T ng/L <6 8     Results from last 7 days   Lab Units 03/02/24  0608 03/02/24  0013   WBC 10*3/mm3 7.64 7.28   HEMOGLOBIN g/dL 13.9 15.2   HEMATOCRIT % 42.1 45.3   PLATELETS 10*3/mm3 258 267                   Invalid input(s): \"LDLCALC\"  The ASCVD Risk score (Yoselyn PAYNE, et al., 2019) failed to calculate for the following reasons:    Cannot find a previous HDL lab    Cannot find a previous total cholesterol lab                   I reviewed his EKG above which demonstrates sinus rhythm some evidence of early repolarization noted but otherwise normal      Echocardiogram 1/20/2021 [facility]:    Overall left ventricular function is normal. The ejection fraction biplane was calculated at 56.6%.   No significant valvular abnormality          Assessment:           Chest pain         Plan:       Mr. Crabtree is 43 y.o. gentleman with past medical history notable for gastric reflux disease, esophageal dysmotility, anxiety, mixed hyperlipidemia, tobacco abuse, and borderline hypertension at times who presents to the emergency room with episode of chest discomfort.  Patient states that he was woken yesterday from sleep with some chest discomfort.  His chest pain is hard to pin down does not particularly typical for cardiac angina could also still be related to his GI pathology which has been an issue in the past.  He does have strong family history of coronary disease which is concerning fortunately his EKG shows no acute issues and his troponin is also normal.  Suplasyn him in a lower risk category.  That being said he has not had much in the way of any cardiac testing recently would not be unreasonable to have him perform a treadmill stress test this morning to exclude any high risk features and if no major issues would likely " recommend initiation of beta-blocker to see if this will help out with some of his symptoms with follow-up as an outpatient.  It may simply be that we need to get better blood pressure control and/or continue to evaluate noncardiac sources.      Chest pain:  Somewhat atypical but given frequent presentations would be reasonable to reassess with treadmill stress test  Pending results can discharge home on metoprolol  Further monitoring as an outpatient  EKG with no ischemic changes and troponin is normal    Essential hypertension:  Has had borderline elevation in the past again pending stress test results could consider adding low-dose metoprolol or even consider amlodipine as an outpatient        Thank you for allowing me to participate in the care of Héctor Crabtree. Feel free to contact me directly with any further questions or concerns.    Master Martines MD  [facility]  03/02/24  06:36 EST

## 2024-03-03 LAB
BH CV STRESS BP STAGE 2: NORMAL
BH CV STRESS DURATION MIN STAGE 1: 3
BH CV STRESS DURATION MIN STAGE 2: 2
BH CV STRESS DURATION SEC STAGE 1: 0
BH CV STRESS DURATION SEC STAGE 2: 39
BH CV STRESS GRADE STAGE 1: 10
BH CV STRESS GRADE STAGE 2: 12
BH CV STRESS HR STAGE 1: 126
BH CV STRESS HR STAGE 2: 167
BH CV STRESS METS STAGE 1: 5
BH CV STRESS METS STAGE 2: 7.5
BH CV STRESS PROTOCOL 1: NORMAL
BH CV STRESS RECOVERY BP: NORMAL MMHG
BH CV STRESS RECOVERY HR: 109 BPM
BH CV STRESS SPEED STAGE 1: 1.7
BH CV STRESS SPEED STAGE 2: 2.5
BH CV STRESS STAGE 1: 1
BH CV STRESS STAGE 2: 2
MAXIMAL PREDICTED HEART RATE: 177 BPM
PERCENT MAX PREDICTED HR: 94.35 %
STRESS BASELINE BP: NORMAL MMHG
STRESS BASELINE HR: 76 BPM
STRESS PERCENT HR: 111 %
STRESS POST ESTIMATED WORKLOAD: 7.1 METS
STRESS POST EXERCISE DUR MIN: 5 MIN
STRESS POST EXERCISE DUR SEC: 39 SEC
STRESS POST PEAK BP: NORMAL MMHG
STRESS POST PEAK HR: 167 BPM
STRESS TARGET HR: 150 BPM

## 2024-03-04 ENCOUNTER — TELEPHONE (OUTPATIENT)
Dept: CARDIOLOGY | Facility: CLINIC | Age: 44
End: 2024-03-04
Payer: COMMERCIAL

## 2024-03-04 NOTE — TELEPHONE ENCOUNTER
Could we reach out to him to see if he could follow up in the office. I saw him in the hospital this weekend for CP but left AMA after his stress test.  Thanks

## 2024-03-05 NOTE — TELEPHONE ENCOUNTER
Pt returned my phone call. He does want to follow-up in our office. Early afternoon works best for him. Can we try and get him an apt with Patricia for a hospital f/u please? Thanks

## 2024-03-06 NOTE — TELEPHONE ENCOUNTER
03/06/2024    Called and left a voicemail for patient to return call to schedule this appointment.     Thanks,   Tom

## 2024-03-07 NOTE — TELEPHONE ENCOUNTER
Caller: Héctor Crabtree    Relationship: Self    Best call back number: [phone]    What is the best time to reach you: ANYTIME    Who are you requesting to speak with (clinical staff, provider,  specific staff member): ANYONE    What was the call regarding: PT IS RETURNING CALL TO SCHEDULE APPT. HE SAID THAT WE CAN SCHEDULE AT A TIME THAT IS CONVENIENT FOR US BECAUSE HE MAY NOT ANSWER THE PHONE    Is it okay if the provider responds through MyChart: NO

## 2024-03-13 ENCOUNTER — OFFICE VISIT (OUTPATIENT)
Dept: CARDIOLOGY | Facility: CLINIC | Age: 44
End: 2024-03-13
Payer: COMMERCIAL

## 2024-03-13 ENCOUNTER — LAB (OUTPATIENT)
Dept: LAB | Facility: HOSPITAL | Age: 44
End: 2024-03-13
Payer: COMMERCIAL

## 2024-03-13 VITALS
SYSTOLIC BLOOD PRESSURE: 134 MMHG | BODY MASS INDEX: 32.2 KG/M2 | HEIGHT: 73 IN | WEIGHT: 243 LBS | HEART RATE: 113 BPM | OXYGEN SATURATION: 97 % | DIASTOLIC BLOOD PRESSURE: 70 MMHG

## 2024-03-13 DIAGNOSIS — R07.89 CHEST PAIN, ATYPICAL: Primary | ICD-10-CM

## 2024-03-13 DIAGNOSIS — R07.89 CHEST PAIN, ATYPICAL: ICD-10-CM

## 2024-03-13 DIAGNOSIS — R07.9 CHEST PAIN, UNSPECIFIED TYPE: ICD-10-CM

## 2024-03-13 LAB
CRP SERPL-MCNC: <0.3 MG/DL (ref 0–0.5)
DEPRECATED RDW RBC AUTO: 38.7 FL (ref 37–54)
ERYTHROCYTE [DISTWIDTH] IN BLOOD BY AUTOMATED COUNT: 12.3 % (ref 12.3–15.4)
ERYTHROCYTE [SEDIMENTATION RATE] IN BLOOD: 2 MM/HR (ref 0–15)
HCT VFR BLD AUTO: 45.1 % (ref 37.5–51)
HGB BLD-MCNC: 14.9 G/DL (ref 13–17.7)
MCH RBC QN AUTO: 28.5 PG (ref 26.6–33)
MCHC RBC AUTO-ENTMCNC: 33 G/DL (ref 31.5–35.7)
MCV RBC AUTO: 86.2 FL (ref 79–97)
PLATELET # BLD AUTO: 286 10*3/MM3 (ref 140–450)
PMV BLD AUTO: 10.1 FL (ref 6–12)
RBC # BLD AUTO: 5.23 10*6/MM3 (ref 4.14–5.8)
WBC NRBC COR # BLD AUTO: 6.98 10*3/MM3 (ref 3.4–10.8)

## 2024-03-13 PROCEDURE — 86140 C-REACTIVE PROTEIN: CPT

## 2024-03-13 PROCEDURE — 85652 RBC SED RATE AUTOMATED: CPT

## 2024-03-13 PROCEDURE — 36415 COLL VENOUS BLD VENIPUNCTURE: CPT

## 2024-03-13 PROCEDURE — 85027 COMPLETE CBC AUTOMATED: CPT

## 2024-03-13 RX ORDER — SODIUM CHLORIDE 9 MG/ML
40 INJECTION, SOLUTION INTRAVENOUS AS NEEDED
OUTPATIENT
Start: 2024-03-13

## 2024-03-13 RX ORDER — METOPROLOL TARTRATE 1 MG/ML
5 INJECTION, SOLUTION INTRAVENOUS
OUTPATIENT
Start: 2024-03-13

## 2024-03-13 RX ORDER — METOPROLOL TARTRATE 50 MG/1
50 TABLET, FILM COATED ORAL
OUTPATIENT
Start: 2024-03-13

## 2024-03-13 RX ORDER — SODIUM CHLORIDE 0.9 % (FLUSH) 0.9 %
10 SYRINGE (ML) INJECTION EVERY 12 HOURS SCHEDULED
OUTPATIENT
Start: 2024-03-13

## 2024-03-13 RX ORDER — METOPROLOL TARTRATE 50 MG/1
TABLET, FILM COATED ORAL
Qty: 4 TABLET | Refills: 0 | Status: SHIPPED | OUTPATIENT
Start: 2024-03-13

## 2024-03-13 RX ORDER — LIDOCAINE HYDROCHLORIDE 10 MG/ML
0.5 INJECTION, SOLUTION INFILTRATION; PERINEURAL ONCE AS NEEDED
OUTPATIENT
Start: 2024-03-13

## 2024-03-13 RX ORDER — NITROGLYCERIN 400 UG/1
2 SPRAY ORAL
OUTPATIENT
Start: 2024-03-13

## 2024-03-13 RX ORDER — SODIUM CHLORIDE 0.9 % (FLUSH) 0.9 %
10 SYRINGE (ML) INJECTION AS NEEDED
OUTPATIENT
Start: 2024-03-13

## 2024-03-13 RX ORDER — NITROGLYCERIN 400 UG/1
1 SPRAY ORAL
OUTPATIENT
Start: 2024-03-13 | End: 2024-03-13

## 2024-03-13 NOTE — PROGRESS NOTES
[facility] Follow Up Office Note     Encounter Date:24  Patient:Héctor Crabtree  :1980  MRN:[id number]      Chief Complaint: No chief complaint on file.        History of Presenting Illness:        Héctor Crabtree is a 43 y.o. male who is here for follow-up.  He is a patient of Dr Martines.    Patient has past medical history significant for GERD, esophageal dysmotility, anxiety, mixed hyperlipidemia, tobacco abuse and borderline hypertension.    Patient was evaluated by Dr. Martines while at [facility] for evaluation of chest pressure on 3/2/2024.  This episode felt similar to prior presentations with esophageal dysmotility and indigestion with reflux.  He took one of his dad's nitroglycerin which did help with his discomfort but moved it to his left shoulder.  That is when he came to the ER to be evaluated.  His blood pressure was elevated.  His treadmill stress was negative for ischemia.  He actually left AMA following this.    Patient reports he is still having chest pain, possibly a little improved.  It is fairly persistent.  It is more on the left side than it used to be.  It is not associated with activity.  There are no alleviating or aggravating factors.  It does not get worse with position changes.  He does note a long GI history but has had upper and lower endoscopy and was told nothing was wrong.    Review of Systems:  Review of Systems   Cardiovascular:  Positive for chest pain. Negative for dyspnea on exertion, leg swelling, orthopnea and palpitations.   Respiratory:  Negative for shortness of breath.        No current outpatient medications on file prior to visit.     No current facility-administered medications on file prior to visit.       No Known Allergies    Past Medical History:   Diagnosis Date    Abdominal pain     CTS (carpal tunnel syndrome)     GERD (gastroesophageal reflux disease)     Hyperlipidemia     Low back pain        Past Surgical  "History:   Procedure Laterality Date    CHOLECYSTECTOMY      COLONOSCOPY N/A 04/06/2021    Procedure: COLONOSCOPY TO CECUM/TI WITH HOT SNARE POLYPECTOMIES AND COLD SNARE POLYPECTOMY;  Surgeon: Master Dubon MD;  Location:  [facility];  Service: Gastroenterology;  Laterality: N/A;  pre: FAMILY HX OF COLON POLYPS AND COLON CANCER  post: NORMAL TI, POLYPS, HEMORRHOIDS    ENDOSCOPY N/A 04/06/2021    Procedure: ESOPHAGOGASTRODUODENOSCOPY WITH COLD BX'S AND ESOPHAGEAL DILATION WITH 54 MM BENITES;  Surgeon: Master Dubon MD;  Location:  [facility];  Service: Gastroenterology;  Laterality: N/A;  pre: GERD, ABDOMINAL PAIN  post: NORMAL    EXCISION MASS HEAD/NECK  2007    WISDOM TOOTH EXTRACTION         Social History     Socioeconomic History    Marital status: Single   Tobacco Use    Smoking status: Every Day     Types: Electronic Cigarette    Smokeless tobacco: Former    Tobacco comments:     Vapes   Vaping Use    Vaping status: Every Day    Substances: Nicotine    Devices: Pre-filled or refillable cartridge, Refillable tank   Substance and Sexual Activity    Alcohol use: Not Currently    Drug use: Yes     Types: Marijuana    Sexual activity: Defer       Family History   Problem Relation Age of Onset    Colon polyps Father     Kidney failure Father     Crohn's disease Paternal Grandmother     Malig Hyperthermia Neg Hx        The following portions of the patient's history were reviewed and updated as appropriate: allergies, current medications, past family history, past medical history, past social history, past surgical history and problem list.       Objective:       Vitals:    03/13/24 1142   BP: 134/70   Pulse: 113   SpO2: 97%   Weight: 110 kg (243 lb)   Height: 185.4 cm (73\")         Physical Exam:  Constitutional: Well appearing, well developed, no acute distress   HENT: Oropharynx clear and membrane moist  Eyes: Normal conjunctiva, no sclera icterus  Neck: Supple, no carotid bruit " bilaterally  Cardiovascular: Regular rate and rhythm, No Murmur, No bilateral lower extremity edema  Pulmonary: Normal respiratory effort, normal lung sounds, no wheezing  Neurological: Alert and orient x 3  Skin: Warm, dry, no ecchymosis, no rash  Psych: Appropriate mood and affect. Normal judgment and insight         Lab Results   Component Value Date     03/02/2024     03/02/2024    K 4.0 03/02/2024    K 3.7 03/02/2024     03/02/2024     03/02/2024    CO2 26.4 03/02/2024    CO2 25.6 03/02/2024    BUN 11 03/02/2024    BUN 12 03/02/2024    CREATININE 0.92 03/02/2024    CREATININE 1.05 03/02/2024    EGFRIFAFRI >60 01/19/2021    EGFRIFAFRI 92 03/22/2018    GLUCOSE 101 (H) 03/02/2024    GLUCOSE 108 (H) 03/02/2024    CALCIUM 8.9 03/02/2024    CALCIUM 9.4 03/02/2024    ALBUMIN 4.3 03/02/2024    ALBUMIN 4.5 03/02/2024    BILITOT 1.2 03/02/2024    BILITOT 0.8 03/02/2024    AST 20 03/02/2024    AST 15 03/02/2024    ALT 33 03/02/2024    ALT 36 03/02/2024     Lab Results   Component Value Date    WBC 6.98 03/13/2024    WBC 7.64 03/02/2024    HGB 14.9 03/13/2024    HGB 13.9 03/02/2024    HCT 45.1 03/13/2024    HCT 42.1 03/02/2024    MCV 86.2 03/13/2024    MCV 85.7 03/02/2024     03/13/2024     03/02/2024     Lab Results   Component Value Date    CHOL  03/02/2024      Comment:      Questionable results   Corrected result. Previous result was 81 mg/dL on 3/2/2024 at 0642 EST.    TRIG  03/02/2024      Comment:      Questionable results  Corrected result. Previous result was 83 mg/dL on 3/2/2024 at 0642 EST.    HDL  03/02/2024      Comment:      Questionable results  Corrected result. Previous result was 21 mg/dL on 3/2/2024 at 0642 EST.    LDL  03/02/2024      Comment:      Questionable results  Corrected result. Previous result was 43 mg/dL on 3/2/2024 at 0642 EST.     Lab Results   Component Value Date    BNP 30.3 01/19/2021     Lab Results   Component Value Date    TROPONINI <0.012  "01/19/2021    TROPONINT <6 03/02/2024     No results found for: \"TSH\"        ECG 12 Lead    Date/Time: 3/13/2024 1:50 PM  Performed by: Patricia Willingham APRN    Authorized by: Patricia Willingham APRN  Comparison: compared with previous ECG from 3/1/2024  Similar to previous ECG  Rhythm: sinus rhythm  Rate: normal  BPM: 98  Other findings: non-specific ST-T wave changes and early repolarization             Assessment:          Diagnosis Plan   1. Chest pain, atypical  C-reactive Protein    Sedimentation Rate    CBC (No Diff)    Adult Transthoracic Echo Complete w/ Color, Spectral and Contrast if Necessary Per Protocol    ECG 12 Lead      2. Chest pain, unspecified type               Plan:       Chest pain - given history seems most likely GI related. I reviewed EKG with Dr Martines and felt likely early repolarization. I did just check inflamm markers to rule out pericarditis which are normal. Stress test low risk for ischemia. We are recommending addition of beta blocker and coronary CTA  Tobacco use - encourage cessation      Orders Placed This Encounter   Procedures    C-reactive Protein     Standing Status:   Future     Number of Occurrences:   1     Standing Expiration Date:   3/13/2025     Order Specific Question:   Release to patient     Answer:   Routine Release [[id number]]    Sedimentation Rate     Standing Status:   Future     Number of Occurrences:   1     Standing Expiration Date:   3/13/2025     Order Specific Question:   Release to patient     Answer:   Routine Release [[id number]]    CBC (No Diff)     Standing Status:   Future     Number of Occurrences:   1     Standing Expiration Date:   3/13/2025     Order Specific Question:   Release to patient     Answer:   Routine Release [[id number]]    ECG 12 Lead     This order was created via procedure documentation     Order Specific Question:   Release to patient     Answer:   Routine Release [[id number]]    Adult Transthoracic Echo Complete w/ Color, " Spectral and Contrast if Necessary Per Protocol     Standing Status:   Future     Standing Expiration Date:   3/13/2025     Order Specific Question:   Reason for exam?     Answer:   Chest Pain     Order Specific Question:   Release to patient     Answer:   Routine Release [[id number]]            MARCIE Camacho  [facility]  03/13/24  13:54 EDT

## 2024-03-13 NOTE — PROGRESS NOTES
Discussed labs with patient    I discussed with the patient with Dr. Martines.  Will start beta-blocker and order coronary CTA with additional 25 mg of metoprolol the night before procedure.

## 2024-03-14 ENCOUNTER — TELEPHONE (OUTPATIENT)
Dept: GASTROENTEROLOGY | Facility: CLINIC | Age: 44
End: 2024-03-14
Payer: COMMERCIAL

## 2024-03-21 ENCOUNTER — HOSPITAL ENCOUNTER (OUTPATIENT)
Dept: CARDIOLOGY | Facility: HOSPITAL | Age: 44
Discharge: HOME | End: 2024-03-21
Admitting: NURSE PRACTITIONER
Payer: COMMERCIAL

## 2024-03-21 VITALS
DIASTOLIC BLOOD PRESSURE: 90 MMHG | HEIGHT: 73 IN | BODY MASS INDEX: 32.14 KG/M2 | SYSTOLIC BLOOD PRESSURE: 138 MMHG | OXYGEN SATURATION: 97 % | WEIGHT: 242.51 LBS | HEART RATE: 85 BPM

## 2024-03-21 DIAGNOSIS — R07.89 CHEST PAIN, ATYPICAL: ICD-10-CM

## 2024-03-21 LAB
AORTIC DIMENSIONLESS INDEX: 0.9 (DI)
ASCENDING AORTA: 2.7 CM
BH CV ECHO MEAS - ACS: 2.14 CM
BH CV ECHO MEAS - AO MAX PG: 5.7 MMHG
BH CV ECHO MEAS - AO MEAN PG: 3 MMHG
BH CV ECHO MEAS - AO V2 MAX: 119 CM/SEC
BH CV ECHO MEAS - AO V2 VTI: 20.7 CM
BH CV ECHO MEAS - AVA(I,D): 3.3 CM2
BH CV ECHO MEAS - EDV(CUBED): 91.1 ML
BH CV ECHO MEAS - EDV(MOD-SP2): 50 ML
BH CV ECHO MEAS - EDV(MOD-SP4): 82 ML
BH CV ECHO MEAS - EF(MOD-BP): 75.3 %
BH CV ECHO MEAS - EF(MOD-SP2): 72 %
BH CV ECHO MEAS - EF(MOD-SP4): 76.8 %
BH CV ECHO MEAS - ESV(CUBED): 28.3 ML
BH CV ECHO MEAS - ESV(MOD-SP2): 14 ML
BH CV ECHO MEAS - ESV(MOD-SP4): 19 ML
BH CV ECHO MEAS - FS: 32.2 %
BH CV ECHO MEAS - IVS/LVPW: 0.89 CM
BH CV ECHO MEAS - IVSD: 0.8 CM
BH CV ECHO MEAS - LAT PEAK E' VEL: 11 CM/SEC
BH CV ECHO MEAS - LV DIASTOLIC VOL/BSA (35-75): 35.1 CM2
BH CV ECHO MEAS - LV MASS(C)D: 123.1 GRAMS
BH CV ECHO MEAS - LV MAX PG: 5.3 MMHG
BH CV ECHO MEAS - LV MEAN PG: 2 MMHG
BH CV ECHO MEAS - LV SYSTOLIC VOL/BSA (12-30): 8.1 CM2
BH CV ECHO MEAS - LV V1 MAX: 115 CM/SEC
BH CV ECHO MEAS - LV V1 VTI: 17.8 CM
BH CV ECHO MEAS - LVIDD: 4.5 CM
BH CV ECHO MEAS - LVIDS: 3 CM
BH CV ECHO MEAS - LVOT AREA: 3.9 CM2
BH CV ECHO MEAS - LVOT DIAM: 2.22 CM
BH CV ECHO MEAS - LVPWD: 0.9 CM
BH CV ECHO MEAS - MED PEAK E' VEL: 9.6 CM/SEC
BH CV ECHO MEAS - MV A DUR: 0.12 SEC
BH CV ECHO MEAS - MV A MAX VEL: 60 CM/SEC
BH CV ECHO MEAS - MV DEC SLOPE: 453.5 CM/SEC2
BH CV ECHO MEAS - MV DEC TIME: 120 SEC
BH CV ECHO MEAS - MV E MAX VEL: 66 CM/SEC
BH CV ECHO MEAS - MV E/A: 1.1
BH CV ECHO MEAS - MV MAX PG: 2.6 MMHG
BH CV ECHO MEAS - MV MEAN PG: 1.36 MMHG
BH CV ECHO MEAS - MV P1/2T: 52.6 MSEC
BH CV ECHO MEAS - MV V2 VTI: 18.6 CM
BH CV ECHO MEAS - MVA(P1/2T): 4.2 CM2
BH CV ECHO MEAS - MVA(VTI): 3.7 CM2
BH CV ECHO MEAS - PA ACC TIME: 0.07 SEC
BH CV ECHO MEAS - PA V2 MAX: 115 CM/SEC
BH CV ECHO MEAS - PULM A REVS DUR: 0.09 SEC
BH CV ECHO MEAS - PULM A REVS VEL: 33.8 CM/SEC
BH CV ECHO MEAS - PULM DIAS VEL: 43.8 CM/SEC
BH CV ECHO MEAS - PULM S/D: 1.22
BH CV ECHO MEAS - PULM SYS VEL: 53.3 CM/SEC
BH CV ECHO MEAS - QP/QS: 0.47
BH CV ECHO MEAS - RV MAX PG: 2.02 MMHG
BH CV ECHO MEAS - RV V1 MAX: 71.1 CM/SEC
BH CV ECHO MEAS - RV V1 VTI: 10.6 CM
BH CV ECHO MEAS - RVOT DIAM: 1.96 CM
BH CV ECHO MEAS - SI(MOD-SP2): 15.4 ML/M2
BH CV ECHO MEAS - SI(MOD-SP4): 27 ML/M2
BH CV ECHO MEAS - SV(LVOT): 68.7 ML
BH CV ECHO MEAS - SV(MOD-SP2): 36 ML
BH CV ECHO MEAS - SV(MOD-SP4): 63 ML
BH CV ECHO MEAS - SV(RVOT): 32 ML
BH CV ECHO MEAS - TAPSE (>1.6): 2 CM
BH CV ECHO MEASUREMENTS AVERAGE E/E' RATIO: 6.41
BH CV XLRA - RV BASE: 3.2 CM
BH CV XLRA - RV LENGTH: 7.5 CM
BH CV XLRA - RV MID: 2.9 CM
BH CV XLRA - TDI S': 15.6 CM/SEC
LEFT ATRIUM VOLUME INDEX: 11.5 ML/M2
SINUS: 3.1 CM
STJ: 2.5 CM

## 2024-03-21 PROCEDURE — 25510000001 PERFLUTREN (DEFINITY) 8.476 MG IN SODIUM CHLORIDE (PF) 0.9 % 10 ML INJECTION: Performed by: NURSE PRACTITIONER

## 2024-03-21 PROCEDURE — 93306 TTE W/DOPPLER COMPLETE: CPT

## 2024-03-21 RX ADMIN — PERFLUTREN 2 ML: 6.52 INJECTION, SUSPENSION INTRAVENOUS at 13:40

## 2024-03-21 NOTE — PROGRESS NOTES
Subjective   Patient ID: Héctor Crabtree is a 43 y.o. male is here today for follow-up with MRI cervical and thoracic done on 4/11/24.     Today Mr. Crabtree states that he is having numbness that mostly pertains to his left side through out his arm, shoulder, and neck.  He states that the pain radiates intermittently from his neck down his back and left arm.    History of Present Illness    He was last seen in the office on February 29 and reported many years of left-sided lower facial numbness, radiating pain with numbness and tingling down the left arm and new pain in the left mid neck.  He follows up today with new cervical and lumbar MRI imaging.    The patient reports that the pain that he previously had in his low back and legs completely resolved with physical therapy.  He denies any pain in the low back or legs at this time.  He also denies any numbness, tingling or weakness.  He denies any balance or gait changes.  He is having some acute left-sided neck pain as a result of lying down on the MRI table.  This is a new pain that started couple of days ago just after the MRI.  He has been taking some NSAIDs with minimal improvement.  He denies any radiating arm pain, numbness, tingling or weakness.  He does continue to have some left-sided facial numbness that has been present for 10 years and is unchanged.  There is no associated pain with a face numbness.    He presents unaccompanied.    The following portions of the patient's history were reviewed and updated as appropriate: allergies, current medications, past family history, past medical history, past social history, past surgical history, and problem list.    Review of Systems   Constitutional:  Negative for fever.   Musculoskeletal:  Positive for back pain and neck pain. Negative for neck stiffness.   Neurological:  Positive for weakness (left arm), numbness (left sided numbness) and headaches. Negative for dizziness and light-headedness.  "          Objective     Vitals:    04/15/24 1505   BP: 122/78   Pulse: 91   Resp: 16   SpO2: 97%   Weight: 113 kg (250 lb)   Height: 185.4 cm (73\")     Body mass index is 32.98 kg/m².    Tobacco Use: High Risk (4/15/2024)    Patient History     Smoking Tobacco Use: Every Day     Smokeless Tobacco Use: Former     Passive Exposure: Not on file          Physical Exam  Vitals reviewed.   Constitutional:       Appearance: He is obese.   HENT:      Head: Atraumatic.   Pulmonary:      Effort: Pulmonary effort is normal.   Musculoskeletal:         General: No tenderness.      Comments: Decreased cervical range of motion with left lateral flexion and rotation  Strength equal and intact bilateral upper extremities, normal muscle bulk and tone   Skin:     General: Skin is warm and dry.   Neurological:      Mental Status: He is alert and oriented to person, place, and time.      GCS: GCS eye subscore is 4. GCS verbal subscore is 5. GCS motor subscore is 6.      Sensory: No sensory deficit.      Motor: No weakness or tremor.      Gait: Gait normal.      Deep Tendon Reflexes:      Reflex Scores:       Tricep reflexes are 2+ on the right side and 2+ on the left side.       Bicep reflexes are 2+ on the right side and 2+ on the left side.       Brachioradialis reflexes are 2+ on the right side and 2+ on the left side.     Comments: Normal sensation to soft touch bilateral upper extremities   Psychiatric:         Mood and Affect: Mood normal.       Neurologic Exam     Mental Status   Oriented to person, place, and time.     Gait, Coordination, and Reflexes     Reflexes   Right brachioradialis: 2+  Left brachioradialis: 2+  Right biceps: 2+  Left biceps: 2+  Right triceps: 2+  Left triceps: 2+          Assessment & Plan   Independent Review of Radiographic Studies:      I personally reviewed the images from the following studies.    MRI cervical spine without contrast from [facility] dated April 11, 2024 reveals advanced degenerative " disc changes at C5-6 where there is a disc osteophyte complex that results in only and mild degree of canal stenosis.  There is moderate bilateral foraminal stenosis at this level.  No other significant findings in the cervical spine.    Thoracic MRI from [facility] dated April 11, 2024 without contrast reveals mild dextroconvex scoliotic curvature of the upper thoracic spine with its apex centered at T5-6.  Minimal degree of canal narrowing at T11-12 secondary to hypertrophy of the posterior elements.  There is mild to moderate left T11-12 and mild left T7-8 foraminal stenosis secondary to facet hypertrophic changes.  No other significant level of canal or foraminal stenosis identified.    Medical Decision Making:      Returns office today with history of left-sided discomfort in the neck, shoulder and upper arm as well as left-sided facial numbness.  He also had history of acute on chronic low back pain.  Exam as noted above, no red flags.  He is had no pain since completing physical therapy for the lumbar region.  He reports the pain completely pain-free in the low back and legs.  His biggest source of discomfort now is pain in the left side of his neck.  I have sent a Medrol Dosepak to his pharmacy in hopes that this will help with that ongoing discomfort.  It appears more musculoskeletal in origin.  He is intact on exam with normal strength, sensation and reflexes.  From our standpoint we will see him back on an as-needed basis.  I have ordered physical therapy to help with the acute on chronic muscular tightness in the neck.  He will call with any questions or changes.    Plan: Take Medrol Dosepak as directed, referral to PT, return to office as needed    Diagnoses and all orders for this visit:    1. Neck pain on left side (Primary)  -     Ambulatory Referral to Physical Therapy Evaluate and treat; Stretching, ROM    Other orders  -     methylPREDNISolone (MEDROL) 4 MG dose pack; Take as directed on package  instructions.  Dispense: 21 tablet; Refill: 0      Return if symptoms worsen or fail to improve.

## 2024-04-11 ENCOUNTER — HOSPITAL ENCOUNTER (OUTPATIENT)
Dept: MRI IMAGING | Facility: HOSPITAL | Age: 44
Discharge: HOME | End: 2024-04-11
Payer: COMMERCIAL

## 2024-04-11 DIAGNOSIS — R20.2 ARM PARESTHESIA, LEFT: ICD-10-CM

## 2024-04-11 DIAGNOSIS — M79.602 LEFT ARM PAIN: ICD-10-CM

## 2024-04-11 DIAGNOSIS — M54.9 MID BACK PAIN ON LEFT SIDE: ICD-10-CM

## 2024-04-11 PROCEDURE — 72141 MRI NECK SPINE W/O DYE: CPT

## 2024-04-11 PROCEDURE — 72146 MRI CHEST SPINE W/O DYE: CPT

## 2024-04-15 ENCOUNTER — OFFICE VISIT (OUTPATIENT)
Dept: NEUROSURGERY | Facility: CLINIC | Age: 44
End: 2024-04-15
Payer: COMMERCIAL

## 2024-04-15 VITALS
WEIGHT: 250 LBS | SYSTOLIC BLOOD PRESSURE: 122 MMHG | OXYGEN SATURATION: 97 % | BODY MASS INDEX: 33.13 KG/M2 | DIASTOLIC BLOOD PRESSURE: 78 MMHG | RESPIRATION RATE: 16 BRPM | HEART RATE: 91 BPM | HEIGHT: 73 IN

## 2024-04-15 DIAGNOSIS — M54.2 NECK PAIN ON LEFT SIDE: Primary | ICD-10-CM

## 2024-04-15 PROCEDURE — 99214 OFFICE O/P EST MOD 30 MIN: CPT | Performed by: NURSE PRACTITIONER

## 2024-04-15 RX ORDER — METHYLPREDNISOLONE 4 MG/1
TABLET ORAL
Qty: 21 TABLET | Refills: 0 | Status: SHIPPED | OUTPATIENT
Start: 2024-04-15

## 2024-05-16 ENCOUNTER — TELEPHONE (OUTPATIENT)
Dept: CARDIOLOGY | Facility: CLINIC | Age: 44
End: 2024-05-16
Payer: COMMERCIAL

## 2024-05-16 NOTE — TELEPHONE ENCOUNTER
I received a refill request from [facility] for Metoprol tar 25mg tab for this patient directed to you. I do not see anywhere in patients chart where he takes the medication nor where you have prescribed it to him.   Please advise.

## 2024-06-06 ENCOUNTER — TELEPHONE (OUTPATIENT)
Dept: CARDIOLOGY | Facility: CLINIC | Age: 44
End: 2024-06-06
Payer: COMMERCIAL

## 2024-06-06 NOTE — TELEPHONE ENCOUNTER
Called patient with instructions to double metoprolol tartrate to 50mg the night before CTA cor as well as take 50mg an hour prior to imaging    He verbalized understanding

## 2024-06-17 ENCOUNTER — HOSPITAL ENCOUNTER (OUTPATIENT)
Dept: CT IMAGING | Facility: HOSPITAL | Age: 44
Discharge: HOME | End: 2024-06-17
Payer: COMMERCIAL

## 2024-06-17 VITALS
OXYGEN SATURATION: 96 % | WEIGHT: 260 LBS | DIASTOLIC BLOOD PRESSURE: 67 MMHG | HEIGHT: 73 IN | BODY MASS INDEX: 34.46 KG/M2 | RESPIRATION RATE: 16 BRPM | HEART RATE: 68 BPM | SYSTOLIC BLOOD PRESSURE: 104 MMHG | TEMPERATURE: 97.5 F

## 2024-06-17 DIAGNOSIS — R07.89 CHEST PAIN, ATYPICAL: ICD-10-CM

## 2024-06-17 LAB — CREAT BLDA-MCNC: 1 MG/DL (ref 0.6–1.3)

## 2024-06-17 PROCEDURE — 75574 CT ANGIO HRT W/3D IMAGE: CPT | Performed by: STUDENT IN AN ORGANIZED HEALTH CARE EDUCATION/TRAINING PROGRAM

## 2024-06-17 PROCEDURE — 93010 ELECTROCARDIOGRAM REPORT: CPT | Performed by: INTERNAL MEDICINE

## 2024-06-17 PROCEDURE — 25510000001 IOPAMIDOL PER 1 ML: Performed by: NURSE PRACTITIONER

## 2024-06-17 PROCEDURE — 75574 CT ANGIO HRT W/3D IMAGE: CPT

## 2024-06-17 PROCEDURE — 82565 ASSAY OF CREATININE: CPT

## 2024-06-17 PROCEDURE — 93005 ELECTROCARDIOGRAM TRACING: CPT | Performed by: NURSE PRACTITIONER

## 2024-06-17 RX ORDER — NITROGLYCERIN 0.4 MG/1
0.8 TABLET SUBLINGUAL ONCE
Status: COMPLETED | OUTPATIENT
Start: 2024-06-17 | End: 2024-06-17

## 2024-06-17 RX ORDER — METOPROLOL TARTRATE 50 MG/1
100 TABLET, FILM COATED ORAL ONCE
Status: COMPLETED | OUTPATIENT
Start: 2024-06-17 | End: 2024-06-17

## 2024-06-17 RX ADMIN — IOPAMIDOL 100 ML: 755 INJECTION, SOLUTION INTRAVENOUS at 11:48

## 2024-06-17 RX ADMIN — METOPROLOL TARTRATE 100 MG: 50 TABLET, FILM COATED ORAL at 10:40

## 2024-06-17 RX ADMIN — NITROGLYCERIN 0.8 MG: 0.4 TABLET SUBLINGUAL at 11:44

## 2024-06-18 LAB
QT INTERVAL: 389 MS
QTC INTERVAL: 405 MS

## 2024-06-24 ENCOUNTER — TELEPHONE (OUTPATIENT)
Dept: CARDIOLOGY | Facility: CLINIC | Age: 44
End: 2024-06-24
Payer: COMMERCIAL

## 2024-06-25 NOTE — TELEPHONE ENCOUNTER
Can we work on getting him rescheduled.  Was supposed to see him Thursday but he was a no show. I have also tried to call a him as well.    Thanks        ----- Message from Stefani Eric sent at 6/18/2024  9:10 AM EDT -----  It appears you have an appointment with him on Thursday.  ----- Message -----  From: Pan Anderson MD  Sent: 6/17/2024   4:44 PM EDT  To: MARCIE Nieto

## 2024-07-08 ENCOUNTER — OFFICE VISIT (OUTPATIENT)
Age: 44
End: 2024-07-08
Payer: COMMERCIAL

## 2024-07-08 VITALS
SYSTOLIC BLOOD PRESSURE: 130 MMHG | HEART RATE: 80 BPM | HEIGHT: 73 IN | BODY MASS INDEX: 34.35 KG/M2 | WEIGHT: 259.2 LBS | DIASTOLIC BLOOD PRESSURE: 70 MMHG

## 2024-07-08 DIAGNOSIS — I25.118 CORONARY ARTERY DISEASE OF NATIVE ARTERY OF NATIVE HEART WITH STABLE ANGINA PECTORIS: Primary | ICD-10-CM

## 2024-07-08 DIAGNOSIS — I10 ESSENTIAL HYPERTENSION: ICD-10-CM

## 2024-07-08 PROCEDURE — 93000 ELECTROCARDIOGRAM COMPLETE: CPT | Performed by: INTERNAL MEDICINE

## 2024-07-08 PROCEDURE — 99214 OFFICE O/P EST MOD 30 MIN: CPT | Performed by: INTERNAL MEDICINE

## 2024-07-08 RX ORDER — AMLODIPINE BESYLATE 5 MG/1
5 TABLET ORAL DAILY
Qty: 90 TABLET | Refills: 3 | Status: SHIPPED | OUTPATIENT
Start: 2024-07-08

## 2024-07-08 RX ORDER — ATORVASTATIN CALCIUM 20 MG/1
20 TABLET, FILM COATED ORAL DAILY
Qty: 90 TABLET | Refills: 3 | Status: SHIPPED | OUTPATIENT
Start: 2024-07-08

## 2024-07-08 RX ORDER — ATORVASTATIN CALCIUM 40 MG/1
20 TABLET, FILM COATED ORAL DAILY
Qty: 90 TABLET | Refills: 3 | Status: SHIPPED | OUTPATIENT
Start: 2024-07-08 | End: 2024-07-08 | Stop reason: SDUPTHER

## 2024-07-08 NOTE — PROGRESS NOTES
[facility] Follow Up Office Note     Encounter Date:24  Patient:Héctor Crabtree  :1980  MRN:[id number]      Chief Complaint:   Chief Complaint   Patient presents with    6 week hospital f/u     History of Presenting Illness:      Mr. Crabtree is a 44 y.o.  gentleman with past medical history notable for moderate coronary artery disease which has been medically managed, gastric reflux disease, esophageal dysmotility, anxiety, mixed hyperlipidemia, tobacco abuse, and borderline hypertension at times who presents for scheduled follow-up.  Patient did undergo further evaluation with stress testing which was indeterminate followed by coronary angiogram showing moderate coronary disease.  He was started on beta-blocker and since doing so symptoms have actually improved unfortunately he has gained a lot of weight he has a lot of generalized fatigue and tiredness.        Review of Systems:  Review of Systems   Constitutional: Positive for weight gain.   HENT: Negative.     Eyes: Negative.    Cardiovascular:  Positive for chest pain.   Respiratory: Negative.     Endocrine: Negative.    Hematologic/Lymphatic: Negative.    Skin: Negative.    Musculoskeletal: Negative.    Gastrointestinal: Negative.    Genitourinary: Negative.    Neurological: Negative.    Psychiatric/Behavioral: Negative.     Allergic/Immunologic: Negative.        Current Outpatient Medications on File Prior to Visit   Medication Sig Dispense Refill    [DISCONTINUED] metoprolol tartrate (LOPRESSOR) 25 MG tablet Take 1 tablet by mouth 2 (Two) Times a Day. 180 tablet 3    [DISCONTINUED] methylPREDNISolone (MEDROL) 4 MG dose pack Take as directed on package instructions. 21 tablet 0     No current facility-administered medications on file prior to visit.       No Known Allergies    Past Medical History:   Diagnosis Date    Abdominal pain     Coronary artery disease of native artery of native heart with stable angina pectoris  "7/8/2024    CTS (carpal tunnel syndrome) 2010    Essential hypertension 7/8/2024    GERD (gastroesophageal reflux disease)     Hyperlipidemia     Low back pain 2010       Past Surgical History:   Procedure Laterality Date    CHOLECYSTECTOMY      COLONOSCOPY N/A 04/06/2021    Procedure: COLONOSCOPY TO CECUM/TI WITH HOT SNARE POLYPECTOMIES AND COLD SNARE POLYPECTOMY;  Surgeon: Master Dubon MD;  Location:  [facility];  Service: Gastroenterology;  Laterality: N/A;  pre: FAMILY HX OF COLON POLYPS AND COLON CANCER  post: NORMAL TI, POLYPS, HEMORRHOIDS    ENDOSCOPY N/A 04/06/2021    Procedure: ESOPHAGOGASTRODUODENOSCOPY WITH COLD BX'S AND ESOPHAGEAL DILATION WITH 54 MM BENITES;  Surgeon: Master Dubon MD;  Location:  [facility];  Service: Gastroenterology;  Laterality: N/A;  pre: GERD, ABDOMINAL PAIN  post: NORMAL    EXCISION MASS HEAD/NECK  2007    WISDOM TOOTH EXTRACTION         Social History     Socioeconomic History    Marital status: Single   Tobacco Use    Smoking status: Every Day     Types: Electronic Cigarette    Smokeless tobacco: Former    Tobacco comments:     Vapes   Vaping Use    Vaping status: Every Day    Substances: Nicotine    Devices: Pre-filled or refillable cartridge, Refillable tank   Substance and Sexual Activity    Alcohol use: Not Currently    Drug use: Yes     Types: Marijuana    Sexual activity: Defer       Family History   Problem Relation Age of Onset    Colon polyps Father     Kidney failure Father     Crohn's disease Paternal Grandmother     Malig Hyperthermia Neg Hx        The following portions of the patient's history were reviewed and updated as appropriate: allergies, current medications, past family history, past medical history, past social history, past surgical history and problem list.       Objective:       Vitals:    07/08/24 0916   BP: 130/70   BP Location: Left arm   Patient Position: Sitting   Pulse: 80   Weight: 118 kg (259 lb 3.2 oz)   Height: 185.4 cm (73\") "     Body mass index is 34.2 kg/m².     Physical Exam:  Constitutional: Well appearing, well developed, no acute distress   HENT: Oropharynx clear and membrane moist  Eyes: Normal conjunctiva, no sclera icterus.  Neck: Supple, no carotid bruit bilaterally.  Cardiovascular: Regular rate and rhythm, No Murmur, No bilateral lower extremity edema.  Pulmonary: Normal respiratory effort, normal lung sounds, no wheezing.  Neurological: Alert and orient x 3.   Skin: Warm, dry, no ecchymosis, no rash.  Psych: Appropriate mood and affect. Normal judgment and insight.         Lab Results   Component Value Date     03/02/2024     03/02/2024    K 4.0 03/02/2024    K 3.7 03/02/2024     03/02/2024     03/02/2024    CO2 26.4 03/02/2024    CO2 25.6 03/02/2024    BUN 11 03/02/2024    BUN 12 03/02/2024    CREATININE 1.00 06/17/2024    CREATININE 0.92 03/02/2024    EGFRIFAFRI >60 01/19/2021    EGFRIFAFRI 92 03/22/2018    GLUCOSE 101 (H) 03/02/2024    GLUCOSE 108 (H) 03/02/2024    CALCIUM 8.9 03/02/2024    CALCIUM 9.4 03/02/2024    ALBUMIN 4.3 03/02/2024    ALBUMIN 4.5 03/02/2024    BILITOT 1.2 03/02/2024    BILITOT 0.8 03/02/2024    AST 20 03/02/2024    AST 15 03/02/2024    ALT 33 03/02/2024    ALT 36 03/02/2024     Lab Results   Component Value Date    WBC 6.98 03/13/2024    WBC 7.64 03/02/2024    HGB 14.9 03/13/2024    HGB 13.9 03/02/2024    HCT 45.1 03/13/2024    HCT 42.1 03/02/2024    MCV 86.2 03/13/2024    MCV 85.7 03/02/2024     03/13/2024     03/02/2024     Lab Results   Component Value Date    CHOL  03/02/2024      Comment:      Questionable results   Corrected result. Previous result was 81 mg/dL on 3/2/2024 at 0642 EST.    TRIG  03/02/2024      Comment:      Questionable results  Corrected result. Previous result was 83 mg/dL on 3/2/2024 at 0642 EST.    HDL  03/02/2024      Comment:      Questionable results  Corrected result. Previous result was 21 mg/dL on 3/2/2024 at 0642 EST.    LDL   "03/02/2024      Comment:      Questionable results  Corrected result. Previous result was 43 mg/dL on 3/2/2024 at 0642 EST.     Lab Results   Component Value Date    BNP 30.3 01/19/2021     Lab Results   Component Value Date    TROPONINI <0.012 01/19/2021    TROPONINT <6 03/02/2024     No results found for: \"TSH\"        ECG 12 Lead    Date/Time: 7/8/2024 10:18 AM  Performed by: Master Martines MD    Authorized by: Master Martines MD  Comparison: compared with previous ECG from 3/13/2024  Similar to previous ECG  Rhythm: sinus rhythm  Other findings: early repolarization          Coronary CTA 6/17/2024 images reviewed by myself:  LAD is a moderate caliber vessel that gives rise to 1, small caliber diagonal in its mid segment.  There is a calcified plaque at the ostium of the LAD resulting in mild stenosis.  There is a mixed plaque in the mid LAD, just after the first septal , resulting in moderate stenosis, there is a dense calcification which does cause some calcium blooming artifact which limits the assessment of this lesion however appears to overall widely patent.  Ramus intermedius is a moderate caliber vessel that branches.  It supplies the majority of the lateral wall.  There is a noncalcified plaque in the midportion resulting in mild stenosis.  The circumflex gives rise to 1 OM in its mid segment, then continues in the AV groove.    The circumflex and its branches appear free of atherosclerotic disease.  The RCA is a large-caliber vessel dominant for posterior circulation giving rise to the PDA and PL system.  Originates from the right coronary cusp in its anticipated location.  There is a mixed plaque in the distal RCA resulting in moderate stenosis.  The PDA appears widely patent but there is motion artifact which degrades image quality of the proximal segment.    Echocardiogram 3/21/2024:  Left ventricular systolic function is hyperdynamic (EF > 70%). Calculated left ventricular EF = " 75.3%  Left ventricular diastolic function was normal.    Treadmill 3/3/2024:  Study impression: EKG negative for ischemia with complaints of focal chest pain noted prior to and during the stress test. Resting ST-T wave changes elevation that improved with stress and returned following exercise.     Echocardiogram 1/20/2021 [facility]:  Overall left ventricular function is normal. The ejection fraction biplane was calculated at 56.6%.   No significant valvular abnormality        Assessment:          Diagnosis Plan   1. Coronary artery disease of native artery of native heart with stable angina pectoris  ECG 12 Lead      2. Essential hypertension               Plan:       Mr. Crabtree is a 44 y.o. gentleman with past medical history notable for moderate coronary artery disease which has been medically managed, gastric reflux disease, esophageal dysmotility, anxiety, mixed hyperlipidemia, tobacco abuse, and borderline hypertension at times who presents for scheduled follow-up.  Patient did undergo further evaluation with stress testing which was indeterminate followed by coronary angiogram        Coronary artery disease with stable angina:  Started on beta-blocker with improvement in symptoms but having a lot of weight gain and fatigue  Has helped out with some of his anxiety issues  Treadmill indeterminate  Coronary CTA demonstrating moderate coronary disease  Will stop beta-blocker transition to calcium channel blocker and monitor response  Will add statin      Essential hypertension:  Blood pressure better monitor response changing from beta-blocker to amlodipine               Master Martines MD  [facility]  07/08/24  10:21 EDT

## 2024-10-08 ENCOUNTER — OFFICE VISIT (OUTPATIENT)
Dept: CARDIOLOGY | Facility: CLINIC | Age: 44
End: 2024-10-08
Payer: COMMERCIAL

## 2024-10-08 VITALS
BODY MASS INDEX: 34.33 KG/M2 | SYSTOLIC BLOOD PRESSURE: 122 MMHG | HEIGHT: 73 IN | DIASTOLIC BLOOD PRESSURE: 84 MMHG | WEIGHT: 259 LBS | HEART RATE: 80 BPM

## 2024-10-08 DIAGNOSIS — F17.298 OTHER TOBACCO PRODUCT NICOTINE DEPENDENCE WITH OTHER NICOTINE-INDUCED DISORDER: ICD-10-CM

## 2024-10-08 DIAGNOSIS — I10 ESSENTIAL HYPERTENSION: ICD-10-CM

## 2024-10-08 DIAGNOSIS — I25.10 CORONARY ARTERY DISEASE INVOLVING NATIVE CORONARY ARTERY OF NATIVE HEART WITHOUT ANGINA PECTORIS: Primary | ICD-10-CM

## 2024-10-08 DIAGNOSIS — K21.9 GASTROESOPHAGEAL REFLUX DISEASE, UNSPECIFIED WHETHER ESOPHAGITIS PRESENT: ICD-10-CM

## 2024-10-08 PROBLEM — F17.200 NICOTINE DEPENDENCE: Status: ACTIVE | Noted: 2024-10-08

## 2024-10-08 PROCEDURE — 93000 ELECTROCARDIOGRAM COMPLETE: CPT | Performed by: NURSE PRACTITIONER

## 2024-10-08 PROCEDURE — 99214 OFFICE O/P EST MOD 30 MIN: CPT | Performed by: NURSE PRACTITIONER

## 2024-10-08 RX ORDER — METOPROLOL TARTRATE 25 MG/1
12.5 TABLET, FILM COATED ORAL 2 TIMES DAILY
Qty: 90 TABLET | Refills: 3 | Status: SHIPPED | OUTPATIENT
Start: 2024-10-08

## 2024-10-08 NOTE — PROGRESS NOTES
Date of Office Visit: 10/08/2024  Encounter Provider: MARCIE Shin  Place of Service: [facility] CARDIOLOGY  Patient Name: Héctor Crabtree  :1980    No chief complaint on file.  : hypertension    HPI: Héctor Crabtree is a 44 y.o. male who is a patient of  Dr. Martines.  He is new to me today and presents for a month office follow-up.  He has a history of moderate coronary artery disease, gastric reflux disease, esophageal dysmotility, anxiety, hyperlipidemia, tobacco abuse and hypertension.  He underwent stress testing for angina which was indeterminate.  Subsequently underwent coronary CTA which showed moderate coronary disease.  Patient was started on beta-blocker and symptoms improved; however, on last office visit,  he noticed quite a bit of weight gain and fatigue.  Beta-blocker was stopped and patient was started on amlodipine.  Statin was also added.    Lipid panel in target range.  Patient does not have diabetes.  He vapes.  Patient notes that with metoprolol, he gained a significant amount of weight and slept quite a bit.  Unfortunately with amlodipine, even though he has not had the fatigue and is not sleeping as much, they discomfort that he was previously having his back.  Otilio options and patient would like to go back on the metoprolol to tartrate however we will try a lower dose.  His blood pressure is controlled at this office visit.  EKG is stable and continues to show early repolarization.      Previous testing and notes have been reviewed by me.   Past Medical History:   Diagnosis Date    Abdominal pain     Coronary artery disease of native artery of native heart with stable angina pectoris 2024    CTS (carpal tunnel syndrome)     Essential hypertension 2024    GERD (gastroesophageal reflux disease)     Hyperlipidemia     Low back pain        Past Surgical History:   Procedure Laterality Date    CHOLECYSTECTOMY      COLONOSCOPY  N/A 04/06/2021    Procedure: COLONOSCOPY TO CECUM/TI WITH HOT SNARE POLYPECTOMIES AND COLD SNARE POLYPECTOMY;  Surgeon: Master Dubon MD;  Location:  [facility];  Service: Gastroenterology;  Laterality: N/A;  pre: FAMILY HX OF COLON POLYPS AND COLON CANCER  post: NORMAL TI, POLYPS, HEMORRHOIDS    ENDOSCOPY N/A 04/06/2021    Procedure: ESOPHAGOGASTRODUODENOSCOPY WITH COLD BX'S AND ESOPHAGEAL DILATION WITH 54 MM BENITES;  Surgeon: Master Dubon MD;  Location:  [facility];  Service: Gastroenterology;  Laterality: N/A;  pre: GERD, ABDOMINAL PAIN  post: NORMAL    EXCISION MASS HEAD/NECK  2007    WISDOM TOOTH EXTRACTION         Social History     Socioeconomic History    Marital status: Single   Tobacco Use    Smoking status: Every Day     Types: Electronic Cigarette    Smokeless tobacco: Former    Tobacco comments:     Vapes   Vaping Use    Vaping status: Every Day    Substances: Nicotine    Devices: Pre-filled or refillable cartridge, Refillable tank   Substance and Sexual Activity    Alcohol use: Not Currently    Drug use: Yes     Types: Marijuana    Sexual activity: Defer       Family History   Problem Relation Age of Onset    Colon polyps Father     Kidney failure Father     Crohn's disease Paternal Grandmother     Malig Hyperthermia Neg Hx        Review of Systems   Constitutional: Negative.   HENT: Negative.     Eyes: Negative.    Cardiovascular: Negative.    Respiratory: Negative.     Endocrine: Negative.    Hematologic/Lymphatic: Negative.    Skin: Negative.    Musculoskeletal: Negative.    Gastrointestinal: Negative.    Genitourinary: Negative.    Neurological: Negative.    Psychiatric/Behavioral: Negative.     Allergic/Immunologic: Negative.        No Known Allergies      Current Outpatient Medications:     atorvastatin (LIPITOR) 20 MG tablet, Take 1 tablet by mouth Daily., Disp: 90 tablet, Rfl: 3    metoprolol tartrate (LOPRESSOR) 25 MG tablet, Take 0.5 tablets by mouth 2 (Two) Times a Day.,  "Disp: 90 tablet, Rfl: 3      Objective:     Vitals:    10/08/24 1012   BP: 122/84   Pulse: 80   Weight: 117 kg (259 lb)   Height: 185.4 cm (72.99\")     Body mass index is 34.18 kg/m².    Coronary CTA 6/17/2024 images reviewed by myself:  LAD is a moderate caliber vessel that gives rise to 1, small caliber diagonal in its mid segment.  There is a calcified plaque at the ostium of the LAD resulting in mild stenosis.  There is a mixed plaque in the mid LAD, just after the first septal , resulting in moderate stenosis, there is a dense calcification which does cause some calcium blooming artifact which limits the assessment of this lesion however appears to overall widely patent.  Ramus intermedius is a moderate caliber vessel that branches.  It supplies the majority of the lateral wall.  There is a noncalcified plaque in the midportion resulting in mild stenosis.  The circumflex gives rise to 1 OM in its mid segment, then continues in the AV groove.    The circumflex and its branches appear free of atherosclerotic disease.  The RCA is a large-caliber vessel dominant for posterior circulation giving rise to the PDA and PL system.  Originates from the right coronary cusp in its anticipated location.  There is a mixed plaque in the distal RCA resulting in moderate stenosis.  The PDA appears widely patent but there is motion artifact which degrades image quality of the proximal segment.     Echocardiogram 3/21/2024:  Left ventricular systolic function is hyperdynamic (EF > 70%). Calculated left ventricular EF = 75.3%  Left ventricular diastolic function was normal.     Treadmill 3/3/2024:  Study impression: EKG negative for ischemia with complaints of focal chest pain noted prior to and during the stress test. Resting ST-T wave changes elevation that improved with stress and returned following exercise.     Echocardiogram 1/20/2021 [facility]:  Overall left ventricular function is normal. The ejection " fraction biplane was calculated at 56.6%.   No significant valvular abnormality     PHYSICAL EXAM:    Constitutional:       Appearance: Healthy appearance. Not in distress.   Neck:      Vascular: No JVR. JVD normal.   Pulmonary:      Effort: Pulmonary effort is normal.      Breath sounds: Normal breath sounds. No wheezing. No rhonchi. No rales.   Chest:      Chest wall: Not tender to palpatation.   Cardiovascular:      PMI at left midclavicular line. Normal rate. Regular rhythm. Normal S1. Normal S2.       Murmurs: There is no murmur.      No gallop.  No click. No rub.   Pulses:     Intact distal pulses.   Edema:     Peripheral edema absent.   Abdominal:      General: Bowel sounds are normal.      Palpations: Abdomen is soft.      Tenderness: There is no abdominal tenderness.   Musculoskeletal: Normal range of motion.         General: No tenderness. Skin:     General: Skin is warm and dry.   Neurological:      General: No focal deficit present.      Mental Status: Alert and oriented to person, place and time.           ECG 12 Lead    Date/Time: 10/8/2024 1:14 PM  Performed by: Perla Dent APRN    Authorized by: Perla Dent APRN  Comparison: compared with previous ECG from 7/8/2024  Similar to previous ECG  Rhythm: sinus rhythm  Rate: normal  BPM: 80  Q waves: II, III and aVF    ST Elevation: II, aVF and V5            Assessment:       Diagnosis Plan   1. Coronary artery disease involving native coronary artery of native heart without angina pectoris        2. Essential hypertension        3. Gastroesophageal reflux disease, unspecified whether esophagitis present        4. Other tobacco product nicotine dependence with other nicotine-induced disorder          No orders of the defined types were placed in this encounter.         Plan:       Coronary artery disease: Coronary artery disease on recent coronary CTA.  On low dose statin.   Hypertension: Controlled.  Going to stop amlodipine and add  low-dose metoprolol to tartrate back.  Anxiety  Nicotine dependence: Patient vapes.  Cessation recommended    Mr. Crabtree follow-up with Dr. Martines 1 year.  He will call sooner for any questions or concerns.         Your medication list            Accurate as of October 8, 2024  1:09 PM. If you have any questions, ask your nurse or doctor.                START taking these medications        Instructions Last Dose Given Next Dose Due   metoprolol tartrate 25 MG tablet  Commonly known as: LOPRESSOR  Started by: Perla Dent      Take 0.5 tablets by mouth 2 (Two) Times a Day.              CONTINUE taking these medications        Instructions Last Dose Given Next Dose Due   atorvastatin 20 MG tablet  Commonly known as: LIPITOR      Take 1 tablet by mouth Daily.              STOP taking these medications      amLODIPine 5 MG tablet  Commonly known as: NORVASC  Stopped by: Perla Dent                  Where to Get Your Medications        These medications were sent to [facility] - [facility] - [facility] AT [facility] - [phone] [facility] [phone] [facility] [place], [place] [address]      Phone: [phone]   metoprolol tartrate 25 MG tablet           As always, it has been a pleasure to participate in your patient's care.      Sincerely,       MARCIE Garrett

## 2025-08-27 RX ORDER — ATORVASTATIN CALCIUM 20 MG/1
20 TABLET, FILM COATED ORAL DAILY
Qty: 90 TABLET | Refills: 3 | Status: SHIPPED | OUTPATIENT
Start: 2025-08-27

## 2025-08-27 RX ORDER — AMLODIPINE BESYLATE 5 MG/1
5 TABLET ORAL DAILY
COMMUNITY
End: 2025-08-27 | Stop reason: SDUPTHER

## 2025-08-27 RX ORDER — AMLODIPINE BESYLATE 5 MG/1
5 TABLET ORAL DAILY
Qty: 90 TABLET | Refills: 3 | Status: SHIPPED | OUTPATIENT
Start: 2025-08-27

## (undated) DEVICE — ADAPT CLN BIOGUARD AIR/H2O DISP

## (undated) DEVICE — BITEBLOCK OMNI BLOC

## (undated) DEVICE — SNAR POLYP SENSATION STDOVL 27 240 BX40

## (undated) DEVICE — THE SINGLE USE ETRAP – POLYP TRAP IS USED FOR SUCTION RETRIEVAL OF ENDOSCOPICALLY REMOVED POLYPS.: Brand: ETRAP

## (undated) DEVICE — TUBING, SUCTION, 1/4" X 10', STRAIGHT: Brand: MEDLINE

## (undated) DEVICE — THE TORRENT IRRIGATION SCOPE CONNECTOR IS USED WITH THE TORRENT IRRIGATION TUBING TO PROVIDE IRRIGATION FLUIDS SUCH AS STERILE WATER DURING GASTROINTESTINAL ENDOSCOPIC PROCEDURES WHEN USED IN CONJUNCTION WITH AN IRRIGATION PUMP (OR ELECTROSURGICAL UNIT).: Brand: TORRENT

## (undated) DEVICE — SENSR O2 OXIMAX FNGR A/ 18IN NONSTR

## (undated) DEVICE — MSK ENDO PORT O2 POM ELITE CURAPLEX A/

## (undated) DEVICE — LN SMPL CO2 SHTRM SD STREAM W/M LUER

## (undated) DEVICE — PATIENT RETURN ELECTRODE, SINGLE-USE, CONTACT QUALITY MONITORING, ADULT, WITH 9FT CORD, FOR PATIENTS WEIGING OVER 33LBS. (15KG): Brand: MEGADYNE

## (undated) DEVICE — KT ORCA ORCAPOD DISP STRL

## (undated) DEVICE — CANN O2 ETCO2 FITS ALL CONN CO2 SMPL A/ 7IN DISP LF